# Patient Record
Sex: FEMALE | Race: WHITE | NOT HISPANIC OR LATINO
[De-identification: names, ages, dates, MRNs, and addresses within clinical notes are randomized per-mention and may not be internally consistent; named-entity substitution may affect disease eponyms.]

---

## 2017-04-12 ENCOUNTER — APPOINTMENT (OUTPATIENT)
Dept: PULMONOLOGY | Facility: CLINIC | Age: 62
End: 2017-04-12

## 2017-04-12 VITALS
BODY MASS INDEX: 30.45 KG/M2 | SYSTOLIC BLOOD PRESSURE: 126 MMHG | OXYGEN SATURATION: 97 % | HEIGHT: 67 IN | DIASTOLIC BLOOD PRESSURE: 66 MMHG | HEART RATE: 88 BPM | WEIGHT: 194 LBS

## 2017-04-12 DIAGNOSIS — T81.4XXS INFECTION FOLLOWING A PROCEDURE, SEQUELA: ICD-10-CM

## 2017-04-12 DIAGNOSIS — Z87.19 PERSONAL HISTORY OF OTHER DISEASES OF THE DIGESTIVE SYSTEM: ICD-10-CM

## 2017-04-12 DIAGNOSIS — Z00.00 ENCOUNTER FOR GENERAL ADULT MEDICAL EXAMINATION W/OUT ABNORMAL FINDINGS: ICD-10-CM

## 2017-04-12 DIAGNOSIS — K43.9 VENTRAL HERNIA W/OUT OBSTRUCTION OR GANGRENE: ICD-10-CM

## 2017-04-12 RX ORDER — TIOTROPIUM BROMIDE INHALATION SPRAY 3.12 UG/1
2.5 SPRAY, METERED RESPIRATORY (INHALATION) DAILY
Qty: 1 | Refills: 5 | Status: DISCONTINUED | COMMUNITY
Start: 2017-04-12 | End: 2017-04-12

## 2017-04-12 RX ORDER — TIOTROPIUM BROMIDE 18 UG/1
18 CAPSULE ORAL; RESPIRATORY (INHALATION)
Refills: 0 | Status: DISCONTINUED | COMMUNITY
End: 2017-04-12

## 2017-04-12 RX ORDER — EPHEDRINE HCL, GUAIFENESIN 12.5; 2 MG/1; MG/1
12.5-2 TABLET ORAL
Refills: 0 | Status: DISCONTINUED | COMMUNITY
End: 2017-04-12

## 2017-04-18 ENCOUNTER — FORM ENCOUNTER (OUTPATIENT)
Age: 62
End: 2017-04-18

## 2017-04-19 ENCOUNTER — OUTPATIENT (OUTPATIENT)
Dept: OUTPATIENT SERVICES | Facility: HOSPITAL | Age: 62
LOS: 1 days | End: 2017-04-19
Payer: COMMERCIAL

## 2017-04-19 ENCOUNTER — APPOINTMENT (OUTPATIENT)
Dept: CT IMAGING | Facility: CLINIC | Age: 62
End: 2017-04-19

## 2017-04-19 DIAGNOSIS — Z00.8 ENCOUNTER FOR OTHER GENERAL EXAMINATION: ICD-10-CM

## 2017-04-19 PROCEDURE — G0297: CPT

## 2017-06-22 ENCOUNTER — APPOINTMENT (OUTPATIENT)
Dept: PULMONOLOGY | Facility: CLINIC | Age: 62
End: 2017-06-22

## 2017-07-13 ENCOUNTER — RX RENEWAL (OUTPATIENT)
Age: 62
End: 2017-07-13

## 2017-10-13 ENCOUNTER — RX RENEWAL (OUTPATIENT)
Age: 62
End: 2017-10-13

## 2017-10-16 ENCOUNTER — RX RENEWAL (OUTPATIENT)
Age: 62
End: 2017-10-16

## 2017-12-14 ENCOUNTER — RX RENEWAL (OUTPATIENT)
Age: 62
End: 2017-12-14

## 2018-12-17 ENCOUNTER — APPOINTMENT (OUTPATIENT)
Dept: PULMONOLOGY | Facility: CLINIC | Age: 63
End: 2018-12-17
Payer: COMMERCIAL

## 2018-12-17 VITALS — DIASTOLIC BLOOD PRESSURE: 70 MMHG | BODY MASS INDEX: 29.76 KG/M2 | SYSTOLIC BLOOD PRESSURE: 120 MMHG | WEIGHT: 190 LBS

## 2018-12-17 VITALS — HEART RATE: 81 BPM | OXYGEN SATURATION: 95 %

## 2018-12-17 PROCEDURE — 99214 OFFICE O/P EST MOD 30 MIN: CPT

## 2019-01-14 ENCOUNTER — INPATIENT (INPATIENT)
Facility: HOSPITAL | Age: 64
LOS: 0 days | Discharge: HOME | DRG: 309 | End: 2019-01-15
Attending: HOSPITALIST | Admitting: HOSPITALIST
Payer: COMMERCIAL

## 2019-01-14 VITALS
OXYGEN SATURATION: 98 % | WEIGHT: 184.97 LBS | DIASTOLIC BLOOD PRESSURE: 92 MMHG | HEIGHT: 69 IN | TEMPERATURE: 98 F | RESPIRATION RATE: 20 BRPM | HEART RATE: 142 BPM | SYSTOLIC BLOOD PRESSURE: 152 MMHG

## 2019-01-14 DIAGNOSIS — I48.92 UNSPECIFIED ATRIAL FLUTTER: ICD-10-CM

## 2019-01-14 LAB
ANION GAP SERPL CALC-SCNC: 13 MMOL/L — SIGNIFICANT CHANGE UP (ref 5–17)
BASOPHILS # BLD AUTO: 0 K/UL — SIGNIFICANT CHANGE UP (ref 0–0.2)
BASOPHILS NFR BLD AUTO: 0.3 % — SIGNIFICANT CHANGE UP (ref 0–2)
BUN SERPL-MCNC: 10 MG/DL — SIGNIFICANT CHANGE UP (ref 8–20)
CALCIUM SERPL-MCNC: 9.3 MG/DL — SIGNIFICANT CHANGE UP (ref 8.6–10.2)
CHLORIDE SERPL-SCNC: 106 MMOL/L — SIGNIFICANT CHANGE UP (ref 98–107)
CO2 SERPL-SCNC: 26 MMOL/L — SIGNIFICANT CHANGE UP (ref 22–29)
CREAT SERPL-MCNC: 0.84 MG/DL — SIGNIFICANT CHANGE UP (ref 0.5–1.3)
EOSINOPHIL # BLD AUTO: 0.3 K/UL — SIGNIFICANT CHANGE UP (ref 0–0.5)
EOSINOPHIL NFR BLD AUTO: 2.8 % — SIGNIFICANT CHANGE UP (ref 0–6)
GLUCOSE SERPL-MCNC: 105 MG/DL — SIGNIFICANT CHANGE UP (ref 70–115)
HCT VFR BLD CALC: 47.8 % — HIGH (ref 37–47)
HGB BLD-MCNC: 16.1 G/DL — HIGH (ref 12–16)
LYMPHOCYTES # BLD AUTO: 3.5 K/UL — SIGNIFICANT CHANGE UP (ref 1–4.8)
LYMPHOCYTES # BLD AUTO: 30.6 % — SIGNIFICANT CHANGE UP (ref 20–55)
MCHC RBC-ENTMCNC: 32 PG — HIGH (ref 27–31)
MCHC RBC-ENTMCNC: 33.7 G/DL — SIGNIFICANT CHANGE UP (ref 32–36)
MCV RBC AUTO: 95 FL — SIGNIFICANT CHANGE UP (ref 81–99)
MONOCYTES # BLD AUTO: 0.4 K/UL — SIGNIFICANT CHANGE UP (ref 0–0.8)
MONOCYTES NFR BLD AUTO: 3.5 % — SIGNIFICANT CHANGE UP (ref 3–10)
NEUTROPHILS # BLD AUTO: 7.1 K/UL — SIGNIFICANT CHANGE UP (ref 1.8–8)
NEUTROPHILS NFR BLD AUTO: 62.5 % — SIGNIFICANT CHANGE UP (ref 37–73)
PLATELET # BLD AUTO: 242 K/UL — SIGNIFICANT CHANGE UP (ref 150–400)
POTASSIUM SERPL-MCNC: 4.4 MMOL/L — SIGNIFICANT CHANGE UP (ref 3.5–5.3)
POTASSIUM SERPL-SCNC: 4.4 MMOL/L — SIGNIFICANT CHANGE UP (ref 3.5–5.3)
RBC # BLD: 5.03 M/UL — SIGNIFICANT CHANGE UP (ref 4.4–5.2)
RBC # FLD: 13 % — SIGNIFICANT CHANGE UP (ref 11–15.6)
SODIUM SERPL-SCNC: 145 MMOL/L — SIGNIFICANT CHANGE UP (ref 135–145)
TROPONIN T SERPL-MCNC: <0.01 NG/ML — SIGNIFICANT CHANGE UP (ref 0–0.06)
WBC # BLD: 11.4 K/UL — HIGH (ref 4.8–10.8)
WBC # FLD AUTO: 11.4 K/UL — HIGH (ref 4.8–10.8)

## 2019-01-14 PROCEDURE — 99222 1ST HOSP IP/OBS MODERATE 55: CPT | Mod: 25

## 2019-01-14 PROCEDURE — 70450 CT HEAD/BRAIN W/O DYE: CPT | Mod: 26

## 2019-01-14 PROCEDURE — 93010 ELECTROCARDIOGRAM REPORT: CPT | Mod: 76

## 2019-01-14 PROCEDURE — 99291 CRITICAL CARE FIRST HOUR: CPT

## 2019-01-14 PROCEDURE — 99406 BEHAV CHNG SMOKING 3-10 MIN: CPT

## 2019-01-14 PROCEDURE — 71045 X-RAY EXAM CHEST 1 VIEW: CPT | Mod: 26

## 2019-01-14 RX ORDER — ALPRAZOLAM 0.25 MG
1 TABLET ORAL
Qty: 0 | Refills: 0 | COMMUNITY

## 2019-01-14 RX ORDER — FLECAINIDE ACETATE 50 MG
100 TABLET ORAL EVERY 12 HOURS
Qty: 0 | Refills: 0 | Status: DISCONTINUED | OUTPATIENT
Start: 2019-01-14 | End: 2019-01-15

## 2019-01-14 RX ORDER — CHOLECALCIFEROL (VITAMIN D3) 125 MCG
1 CAPSULE ORAL
Qty: 0 | Refills: 0 | COMMUNITY

## 2019-01-14 RX ORDER — PREGABALIN 225 MG/1
1 CAPSULE ORAL
Qty: 0 | Refills: 0 | COMMUNITY

## 2019-01-14 RX ORDER — DILTIAZEM HCL 120 MG
5 CAPSULE, EXT RELEASE 24 HR ORAL
Qty: 125 | Refills: 0 | Status: DISCONTINUED | OUTPATIENT
Start: 2019-01-14 | End: 2019-01-14

## 2019-01-14 RX ORDER — TIOTROPIUM BROMIDE AND OLODATEROL 3.124; 2.736 UG/1; UG/1
2 SPRAY, METERED RESPIRATORY (INHALATION)
Qty: 0 | Refills: 0 | COMMUNITY

## 2019-01-14 RX ORDER — ALBUTEROL 90 UG/1
2 AEROSOL, METERED ORAL
Qty: 0 | Refills: 0 | COMMUNITY

## 2019-01-14 RX ORDER — ALPRAZOLAM 0.25 MG
0.5 TABLET ORAL DAILY
Qty: 0 | Refills: 0 | Status: DISCONTINUED | OUTPATIENT
Start: 2019-01-14 | End: 2019-01-15

## 2019-01-14 RX ORDER — ROFLUMILAST 500 UG/1
1 TABLET ORAL
Qty: 0 | Refills: 0 | COMMUNITY

## 2019-01-14 RX ORDER — VORTIOXETINE 5 MG/1
1 TABLET, FILM COATED ORAL
Qty: 0 | Refills: 0 | COMMUNITY

## 2019-01-14 RX ORDER — ADENOSINE 3 MG/ML
6 INJECTION INTRAVENOUS ONCE
Qty: 0 | Refills: 0 | Status: COMPLETED | OUTPATIENT
Start: 2019-01-14 | End: 2019-01-14

## 2019-01-14 RX ORDER — APIXABAN 2.5 MG/1
5 TABLET, FILM COATED ORAL EVERY 12 HOURS
Qty: 0 | Refills: 0 | Status: DISCONTINUED | OUTPATIENT
Start: 2019-01-14 | End: 2019-01-15

## 2019-01-14 RX ORDER — ALBUTEROL 90 UG/1
1 AEROSOL, METERED ORAL
Qty: 0 | Refills: 0 | Status: DISCONTINUED | OUTPATIENT
Start: 2019-01-14 | End: 2019-01-15

## 2019-01-14 RX ORDER — FLUTICASONE PROPIONATE AND SALMETEROL 50; 250 UG/1; UG/1
1 POWDER ORAL; RESPIRATORY (INHALATION)
Qty: 0 | Refills: 0 | COMMUNITY

## 2019-01-14 RX ADMIN — Medication 5 MG/HR: at 19:15

## 2019-01-14 RX ADMIN — Medication 100 MILLIGRAM(S): at 19:28

## 2019-01-14 RX ADMIN — APIXABAN 5 MILLIGRAM(S): 2.5 TABLET, FILM COATED ORAL at 19:27

## 2019-01-14 RX ADMIN — Medication 5 MG/HR: at 16:59

## 2019-01-14 RX ADMIN — ADENOSINE 6 MILLIGRAM(S): 3 INJECTION INTRAVENOUS at 16:53

## 2019-01-14 NOTE — H&P ADULT - HISTORY OF PRESENT ILLNESS
Pt seen and examined at 8:45 PM on 1/14/18  Patient presented to the ED today w/ palpitations and a headache. PMH COPD, Anxiety, bronchitis, Tobacco dependence.   Patient states while she was at home today she began to have palpitations and a severe headache, after atul palpitations which lasted approx 15 minutes she had a chest pressure and had body aches, she continued to feel unwell at home so took 2 baby aspirin and then came to the ED. She denies any past hx of MI/stroke/TIA, she had a cardiac cath in March 2018 which showed no obstructive disease as per cardiology note, she states while in the ED she got some medicine that made her feel better. She has no other complaints at this time. She feels back at her baseline.   In ED she was found to be in A. flutter w/ 2:1 AV conduction, she received Adenosine and Cardizem and convereted to NSR. She also was started on flecanide and Eliquis. She was seen by cardiology.   Currently denies headaches, nausea, vomiting, chest pain, SOB, palpitations, abdominal pain, constipation, diarrhea, melena, hematochezia, dysuria. She had a CT head which was negative for acute Pathology.

## 2019-01-14 NOTE — ED ADULT NURSE NOTE - OBJECTIVE STATEMENT
63 yofpresents to ed with tachycardia/ palpitations irregular ekg. pt reports having chest pressure for 2 hours. adenosine given ivp, a flutter on monitor. hourly rounding down to 20, vs otherwise wdl  xanax copd inhaler  10 cardizem [id number]

## 2019-01-14 NOTE — H&P ADULT - NSHPSOCIALHISTORY_GEN_ALL_CORE
Denies fam hx of Atrial fib or flutter in mother or father.   1/2 ppd smoker, rare etoh use, no drug use.

## 2019-01-14 NOTE — H&P ADULT - PROBLEM SELECTOR PLAN 3
Patient was counselled on tobacco cessation. She was informed of the increased risk of lung cancer and cardiovascular disease, COPD among other health risks associated with smoking and tobacco use. Cessation was advised. Patient has tried Nicoderm in past and does not wish to use today. She wants to quit smoking. 3 min spent on counselling.

## 2019-01-14 NOTE — ED PROVIDER NOTE - OBJECTIVE STATEMENT
C/C PALPITATIONS  62 YO FEMALE  WITH ABOVE C/C.  PT GIVES HX OF HAVING SOME BLURRY VISION AT NOON. SHE STATES IT RESOLVED ON ITS OWN. AROUND 2:30 PM SHE DEVELOPED A SEVERE HEADACHE ALL OVER AND PALPITATIONS. SHE THEN DEVELOPED CP GOING TO HER BACK. HER DAUGHTER GAVE HER ASPIRIN AND A SHORT WHILE AFTER THAT SHE FELT BETTER. UPON ARRIVAL TO THE ER SHE WAS FOUND TO HAVE AN ELEVATED HEART RATE OVER 130.   NO SYMPTOMS ASSOCIATED WITH ELEVATED HEART RATE   NO OTHER SYMPTOMS RELATED TO VISIT.  MED HX COPD, + CIGARETTES.

## 2019-01-14 NOTE — H&P ADULT - NSHPPHYSICALEXAM_GEN_ALL_CORE
T(C): 36.7 (01-14-19 @ 16:12), Max: 36.7 (01-14-19 @ 16:12)  HR: 132 (01-14-19 @ 17:26) (132 - 150)  BP: 146/65 (01-14-19 @ 17:26) (113/61 - 152/92)  RR: 20 (01-14-19 @ 17:26) (20 - 20)  SpO2: 96% (01-14-19 @ 17:26) (96% - 99%)  Wt(kg): --    Physical Exam:   GENERAL: well-groomed, well-developed, NAD  HEENT: head NC/AT; EOM intact, PERRLA, conjunctiva & sclera clear; hearing grossly intact, moist mucous membranes  NECK: supple, no JVD  RESPIRATORY: CTA B/L, no wheezing, rales, rhonchi or rubs  CARDIOVASCULAR: S1&S2, RRR, no murmurs or gallops  ABDOMEN: soft, non-tender, non-distended, + Bowel sounds x4 quadrants, no guarding, rebound or rigidity  MUSCULOSKELETAL:  no clubbing, cyanosis or edema of all 4 extremities  LYMPH: no cervical lymphadenopathy  VASCULAR: Radial pulses 2+ bilaterally, no varicose veins   SKIN: warm and dry, color normal  NEUROLOGIC: AA&O X3, CN2-12 intact w/ no focal deficits, no sensory loss, motor Strength 5/5 in UE & LE B/L  Psych: Normal mood and affect, normal behavior

## 2019-01-14 NOTE — ED PROVIDER NOTE - CRITICAL CARE PROVIDED
additional history taking/consultation with other physicians/direct patient care (not related to procedure)/consult w/ pt's family directly relating to pts condition/interpretation of diagnostic studies/documentation/50 MINUTES

## 2019-01-14 NOTE — ED ADULT TRIAGE NOTE - CHIEF COMPLAINT QUOTE
PAtient BIBA, complains of palpitations for past 2 hours, heaviness to chest, denies pain, denies any SOB

## 2019-01-14 NOTE — H&P ADULT - ASSESSMENT
Patient presented to the ED today w/ palpitations and a headache found to be in A. flutter. PMH COPD, Anxiety, bronchitis, Tobacco dependence.

## 2019-01-14 NOTE — PHARMACOTHERAPY INTERVENTION NOTE - COMMENTS
Completed patient's medication reconciliation. All medication information obtained from patient's home pharmacy and verified with patient.
Heart palpitations

## 2019-01-14 NOTE — CONSULT NOTE ADULT - SUBJECTIVE AND OBJECTIVE BOX
[facility], [facility]                                          [address], [address], [place] [address]                                               PHONE: [phone]    FAX: [phone] [address], [address], [place] [address]                                       PHONE: [phone]    FAX: [phone]  *******************************************************************************    Reason for Consult: Atrial flutter    HPI:  AKSHAT MESSINA is a 63y woman with significant history of COPD and current everyday smoking, who presents to the ER for evaluation of palpitations, found to have atrial flutter with 2:1 AV conduction.  The patient states that she had been feeling well after recovering from recent bronchitis/COPD exacerbation when she developed headache and palpitations and decided to come to ER.  At the time of this evaluation, the patient states that she is feeling much better and denies any chest pain, palpitations, shortness of breath or difficulty breathing.  No syncope or near syncope.     PAST MEDICAL & SURGICAL HISTORY:  Chronic bronchitis with acute exacerbation  COPD (chronic obstructive pulmonary disease)      Biaxin (Unknown)  Cipro (Unknown)  Levaquin (Unknown)  sulfa drugs (Unknown)      MEDICATIONS  (STANDING):  diltiazem    Tablet 60 milliGRAM(s) Oral every 6 hours  diltiazem Infusion 5 mG/Hr (5 mL/Hr) IV Continuous <Continuous>  flecainide 100 milliGRAM(s) Oral every 12 hours    MEDICATIONS  (PRN):      Social History: no active tobacco / EtOH / IVDA    Family History:     ROS: As noted above, otherwise unremarkable.    Vital Signs Last 24 Hrs  T(C): 36.7 (14 Jan 2019 16:12), Max: 36.7 (14 Jan 2019 16:12)  T(F): 98 (14 Jan 2019 16:12), Max: 98 (14 Jan 2019 16:12)  HR: 132 (14 Jan 2019 17:26) (132 - 150)  BP: 146/65 (14 Jan 2019 17:26) (113/61 - 152/92)  BP(mean): --  RR: 20 (14 Jan 2019 17:26) (20 - 20)  SpO2: 96% (14 Jan 2019 17:26) (96% - 99%)    I&O's Detail    14 Jan 2019 07:01  -  14 Jan 2019 17:40  --------------------------------------------------------  IN:    diltiazem Infusion: 30 mL  Total IN: 30 mL    OUT:  Total OUT: 0 mL    Total NET: 30 mL        I&O's Summary    14 Jan 2019 07:01  -  14 Jan 2019 17:40  --------------------------------------------------------  IN: 30 mL / OUT: 0 mL / NET: 30 mL            PHYSICAL EXAM:  General: Appears well developed, well nourished, no acute distress  HEENT: Head: normocephalic, atraumatic  Eyes: Pupils equal and reactive  Neck: Supple, no carotid bruit, no JVD, no HJR  CARDIOVASCULAR: Regular tachycardia S1 and S2, no murmur, rub, or gallop  LUNGS: Clear to auscultation bilaterally, no rales, rhonchi or wheeze  ABDOMEN: Soft, nontender, non-distended, positive bowel sounds, no mass or bruit  EXTREMITIES: No edema, distal pulses WNL  SKIN: Warm and dry with normal turgor  NEURO: Alert & oriented x 3, grossly intact  PSYCH: normal mood and affect    LABS:                        16.1   11.4  )-----------( 242      ( 14 Jan 2019 16:41 )             47.8     01-14    145  |  106  |  10.0  ----------------------------<  105  4.4   |  26.0  |  0.84    Ca    9.3      14 Jan 2019 16:41      CARDIAC MARKERS ( 14 Jan 2019 16:41 )  x     / <0.01 ng/mL / x     / x     / x            RADIOLOGY & ADDITIONAL STUDIES:    ECG: atrial flutter with 2:1 AV conduction    ECHO (03/2018): mild concentric LVH with normal left ventricular systolic function; no hemodynamically significant valvular lesions noted.     Carotid Duplex (03/2018): mild-moderate (16-49%) b/l ICA stenosis    CARDIAC CATHETERIZATION (03/2018): no evidence of obstructive coronary artery disease.     Assessment and Plan:  In summary, AKSHAT MESSINA is a 63y woman with significant history of COPD and current everyday smoking, seen in the ER for evaluation of palpitations, found to have atrial flutter with 2:1 AV conduction.    - Monitor on telemetry  - The patient has been chest pain free since admission, without ischemic ECG abnormalities and negative initial troponin; doubt acute MI.  Moreover, the patient is s/p cardiac cath 03/2018 showing no evidence of obstructive coronary disease.   - No evidence of decompensated HF clinically.   - Echocardiogram from 03/2018 reviewed as above.    - Rhythm/hemodynamics: BP stable; patient remains in atrial flutter with 2:1 AV conduction.  Brief episodes of sinus rhythm noted.  Discussed with EP, Dr. Bustamante.  Given underlying COPD will avoid beta blockers and amiodarone.  Will start flecainide 100 mg PO bid along with cardizem 60 mg PO q6hr and titrate as needed to control HR and wean off cardizem drip.   - If converts back to and maintains sinus rhythm, would anticipate discharge to home tomorrow with early outpatient follow up with Dr. Woody.   - Check TSH.   - Keep K > 4, Mg > 2    Thank you for allowing me to participate in the care of your patient.      Sincerely,    Nick Lopez M.D. [facility], [facility]                                          [address], [place] [address]                                               PHONE: [phone]    FAX: [phone] [address], [address], [place] [address]                                       PHONE: [phone]    FAX: [phone]  *******************************************************************************    Reason for Consult: Atrial flutter    HPI:  AKSHAT MESSINA is a 63y woman with significant history of COPD and current everyday smoking, who presents to the ER for evaluation of palpitations, found to have atrial flutter with 2:1 AV conduction.  The patient states that she had been feeling well after recovering from recent bronchitis/COPD exacerbation when she developed headache and palpitations and decided to come to ER.  At the time of this evaluation, the patient states that she is feeling much better and denies any chest pain, palpitations, shortness of breath or difficulty breathing.  No syncope or near syncope.     PAST MEDICAL & SURGICAL HISTORY:  Chronic bronchitis with acute exacerbation  COPD (chronic obstructive pulmonary disease)      Biaxin (Unknown)  Cipro (Unknown)  Levaquin (Unknown)  sulfa drugs (Unknown)      MEDICATIONS  (STANDING):  diltiazem    Tablet 60 milliGRAM(s) Oral every 6 hours  diltiazem Infusion 5 mG/Hr (5 mL/Hr) IV Continuous <Continuous>  flecainide 100 milliGRAM(s) Oral every 12 hours    MEDICATIONS  (PRN):      Social History: no active tobacco / EtOH / IVDA    Family History:     ROS: As noted above, otherwise unremarkable.    Vital Signs Last 24 Hrs  T(C): 36.7 (14 Jan 2019 16:12), Max: 36.7 (14 Jan 2019 16:12)  T(F): 98 (14 Jan 2019 16:12), Max: 98 (14 Jan 2019 16:12)  HR: 132 (14 Jan 2019 17:26) (132 - 150)  BP: 146/65 (14 Jan 2019 17:26) (113/61 - 152/92)  BP(mean): --  RR: 20 (14 Jan 2019 17:26) (20 - 20)  SpO2: 96% (14 Jan 2019 17:26) (96% - 99%)    I&O's Detail    14 Jan 2019 07:01  -  14 Jan 2019 17:40  --------------------------------------------------------  IN:    diltiazem Infusion: 30 mL  Total IN: 30 mL    OUT:  Total OUT: 0 mL    Total NET: 30 mL        I&O's Summary    14 Jan 2019 07:01  -  14 Jan 2019 17:40  --------------------------------------------------------  IN: 30 mL / OUT: 0 mL / NET: 30 mL            PHYSICAL EXAM:  General: Appears well developed, well nourished, no acute distress  HEENT: Head: normocephalic, atraumatic  Eyes: Pupils equal and reactive  Neck: Supple, no carotid bruit, no JVD, no HJR  CARDIOVASCULAR: Regular tachycardia S1 and S2, no murmur, rub, or gallop  LUNGS: Clear to auscultation bilaterally, no rales, rhonchi or wheeze  ABDOMEN: Soft, nontender, non-distended, positive bowel sounds, no mass or bruit  EXTREMITIES: No edema, distal pulses WNL  SKIN: Warm and dry with normal turgor  NEURO: Alert & oriented x 3, grossly intact  PSYCH: normal mood and affect    LABS:                        16.1   11.4  )-----------( 242      ( 14 Jan 2019 16:41 )             47.8     01-14    145  |  106  |  10.0  ----------------------------<  105  4.4   |  26.0  |  0.84    Ca    9.3      14 Jan 2019 16:41      CARDIAC MARKERS ( 14 Jan 2019 16:41 )  x     / <0.01 ng/mL / x     / x     / x            RADIOLOGY & ADDITIONAL STUDIES:    ECG: atrial flutter with 2:1 AV conduction    ECHO (03/2018): mild concentric LVH with normal left ventricular systolic function; no hemodynamically significant valvular lesions noted.     Carotid Duplex (03/2018): mild-moderate (16-49%) b/l ICA stenosis    CARDIAC CATHETERIZATION (03/2018): no evidence of obstructive coronary artery disease.     Assessment and Plan:  In summary, AKSHAT MESSINA is a 63y woman with significant history of COPD and current everyday smoking, seen in the ER for evaluation of palpitations, found to have atrial flutter with 2:1 AV conduction.    - Monitor on telemetry  - The patient has been chest pain free since admission, without ischemic ECG abnormalities and negative initial troponin; doubt acute MI.  Moreover, the patient is s/p cardiac cath 03/2018 showing no evidence of obstructive coronary disease.   - No evidence of decompensated HF clinically.   - Echocardiogram from 03/2018 reviewed as above.    - Rhythm/hemodynamics: BP stable; patient remains in atrial flutter with 2:1 AV conduction.  Brief episodes of sinus rhythm noted.  Discussed with EP, Dr. Bustamante.  Given underlying COPD will avoid beta blockers and amiodarone.  Will start flecainide 100 mg PO bid along with cardizem 60 mg PO q6hr and titrate as needed to control HR and wean off cardizem drip.   - If converts back to and maintains sinus rhythm, would anticipate discharge to home tomorrow with early outpatient follow up with Dr. Woody.   - Paroxsymal atrial flutter: CHADSVASC score appears to be 1 however as we are giving antiarrhythmics with the goal of converting to and maintaining sinus rhythm, after discussing risks, benefits, and alternatives, will start eliquis 5 mg bid.   - Check TSH.   - Keep K > 4, Mg > 2    Thank you for allowing me to participate in the care of your patient.      Sincerely,    Nick Lopez M.D.

## 2019-01-14 NOTE — ED ADULT NURSE NOTE - NSIMPLEMENTINTERV_GEN_ALL_ED
Implemented All Universal Safety Interventions:  [place] to call system. Call bell, personal items and telephone within reach. Instruct patient to call for assistance. Room bathroom lighting operational. Non-slip footwear when patient is off stretcher. Physically safe environment: no spills, clutter or unnecessary equipment. Stretcher in lowest position, wheels locked, appropriate side rails in place.

## 2019-01-14 NOTE — ED ADULT NURSE REASSESSMENT NOTE - NS ED NURSE REASSESS COMMENT FT1
Report received from zelalem shannon.  Pt resting comfortably on stretcher.  No complaints of pain.  Respirations even and unlabored.  Family at bedside for supportive care.  Pt. noted to be sinus on the cardiac monitor with HR 74.  Pt. denies chest pain/sob or feeling of palpitations. Awaiting bed placement.  PIV wnl; flushing without difficulty.  In NAD, will continue to monitor.

## 2019-01-14 NOTE — H&P ADULT - PROBLEM SELECTOR PLAN 1
A flutter with 2:1 AV conduction. Converted to NSR.   -monitor on telemetry.   -cardio rec's appreciated.   -started on flecanide and Eliquis and PO cardizem.   -f/u TSH level.   -ordered TTE

## 2019-01-14 NOTE — ED PROVIDER NOTE - PROGRESS NOTE DETAILS
PT WITH TACHYARRHYTHMIA  GIVEN ADENOCARD TO SLOW RATE. A FLUTTER REVEALED. IV CARDIZEM STARTED AS BOLUS THEN DRIP. RATE ADJUSTED. HR DECREASED TO 79. BP STABLE. SEEN BY [facility]. WILL ADMIT

## 2019-01-15 ENCOUNTER — TRANSCRIPTION ENCOUNTER (OUTPATIENT)
Age: 64
End: 2019-01-15

## 2019-01-15 VITALS
HEART RATE: 69 BPM | OXYGEN SATURATION: 94 % | RESPIRATION RATE: 20 BRPM | TEMPERATURE: 98 F | SYSTOLIC BLOOD PRESSURE: 124 MMHG | DIASTOLIC BLOOD PRESSURE: 73 MMHG

## 2019-01-15 DIAGNOSIS — F17.200 NICOTINE DEPENDENCE, UNSPECIFIED, UNCOMPLICATED: ICD-10-CM

## 2019-01-15 DIAGNOSIS — Z29.9 ENCOUNTER FOR PROPHYLACTIC MEASURES, UNSPECIFIED: ICD-10-CM

## 2019-01-15 DIAGNOSIS — Z98.890 OTHER SPECIFIED POSTPROCEDURAL STATES: Chronic | ICD-10-CM

## 2019-01-15 DIAGNOSIS — I48.92 UNSPECIFIED ATRIAL FLUTTER: ICD-10-CM

## 2019-01-15 DIAGNOSIS — J44.9 CHRONIC OBSTRUCTIVE PULMONARY DISEASE, UNSPECIFIED: ICD-10-CM

## 2019-01-15 PROCEDURE — 96365 THER/PROPH/DIAG IV INF INIT: CPT

## 2019-01-15 PROCEDURE — 36415 COLL VENOUS BLD VENIPUNCTURE: CPT

## 2019-01-15 PROCEDURE — 71045 X-RAY EXAM CHEST 1 VIEW: CPT

## 2019-01-15 PROCEDURE — 85027 COMPLETE CBC AUTOMATED: CPT

## 2019-01-15 PROCEDURE — 84484 ASSAY OF TROPONIN QUANT: CPT

## 2019-01-15 PROCEDURE — 99239 HOSP IP/OBS DSCHRG MGMT >30: CPT

## 2019-01-15 PROCEDURE — 93005 ELECTROCARDIOGRAM TRACING: CPT

## 2019-01-15 PROCEDURE — 99291 CRITICAL CARE FIRST HOUR: CPT | Mod: 25

## 2019-01-15 PROCEDURE — 70450 CT HEAD/BRAIN W/O DYE: CPT

## 2019-01-15 PROCEDURE — 96366 THER/PROPH/DIAG IV INF ADDON: CPT

## 2019-01-15 PROCEDURE — 80048 BASIC METABOLIC PNL TOTAL CA: CPT

## 2019-01-15 PROCEDURE — 96375 TX/PRO/DX INJ NEW DRUG ADDON: CPT

## 2019-01-15 RX ORDER — DILTIAZEM HCL 120 MG
1 CAPSULE, EXT RELEASE 24 HR ORAL
Qty: 30 | Refills: 0 | OUTPATIENT
Start: 2019-01-15 | End: 2019-02-13

## 2019-01-15 RX ORDER — FLECAINIDE ACETATE 50 MG
1 TABLET ORAL
Qty: 60 | Refills: 0 | OUTPATIENT
Start: 2019-01-15 | End: 2019-02-13

## 2019-01-15 RX ORDER — APIXABAN 2.5 MG/1
1 TABLET, FILM COATED ORAL
Qty: 60 | Refills: 0 | OUTPATIENT
Start: 2019-01-15 | End: 2019-02-13

## 2019-01-15 RX ADMIN — Medication 100 MILLIGRAM(S): at 06:31

## 2019-01-15 RX ADMIN — APIXABAN 5 MILLIGRAM(S): 2.5 TABLET, FILM COATED ORAL at 06:31

## 2019-01-15 NOTE — DISCHARGE NOTE ADULT - MEDICATION SUMMARY - MEDICATIONS TO TAKE
I will START or STAY ON the medications listed below when I get home from the hospital:    flecainide 100 mg oral tablet  -- 1 tab(s) by mouth every 12 hours  -- Indication: For AFlutter    Cardizem  mg/24 hours oral capsule, extended release  -- 1 cap(s) by mouth once a day   -- It is very important that you take or use this exactly as directed.  Do not skip doses or discontinue unless directed by your doctor.  Some non-prescription drugs may aggravate your condition.  Read all labels carefully.  If a warning appears, check with your doctor before taking.  Swallow whole.  Do not crush.    -- Indication: For AFlutter    apixaban 5 mg oral tablet  -- 1 tab(s) by mouth every 12 hours  -- Indication: For AFlutter    Xanax 0.5 mg oral tablet  -- 1 tab(s) by mouth once a day  -- Indication: For Anxieety    ProAir HFA 90 mcg/inh inhalation aerosol  -- 2 puff(s) inhaled 4 times a day, As Needed  -- Indication: For sob    Advair Diskus 250 mcg-50 mcg inhalation powder  -- 1 puff(s) inhaled 2 times a day  -- Indication: For sob    Daliresp 500 mcg oral tablet  -- 1 tab(s) by mouth once a day  -- Indication: For sob    Vitamin B-12 100 mcg oral tablet  -- 1 tab(s) by mouth once a day  -- Indication: For nutrition    Vitamin D3 400 intl units oral tablet  -- 1 tab(s) by mouth once a day  -- Indication: For nutrition

## 2019-01-15 NOTE — DISCHARGE NOTE ADULT - CARE PLAN
Principal Discharge DX:	Atrial flutter, unspecified type  Goal:	resolved  Assessment and plan of treatment:	Continue with meds as directed. Follow up with your primary doctor & cardiologist within 1 week of discharge. Please get TSH level  & Echo done as outpatient  Secondary Diagnosis:	COPD (chronic obstructive pulmonary disease)  Assessment and plan of treatment:	Continue with home meds as directed. Follow up with your primary doctor & pulmonologist

## 2019-01-15 NOTE — DISCHARGE NOTE ADULT - PLAN OF CARE
resolved Continue with meds as directed. Follow up with your primary doctor & cardiologist within 1 week of discharge. Please get TSH level  & Echo done as outpatient Continue with home meds as directed. Follow up with your primary doctor & pulmonologist

## 2019-01-15 NOTE — DISCHARGE NOTE ADULT - CARE PROVIDER_API CALL
Yariel Jonas (MD), Cardiovascular Disease; Internal Medicine  [address] [place] [address]  Phone: [phone]  Fax: [phone]

## 2019-01-15 NOTE — DISCHARGE NOTE ADULT - HOSPITAL COURSE
Patient presented to the ED today w/ palpitations and a headache found to be in A. flutter. PMH COPD, Anxiety, bronchitis, Tobacco dependence.      Problem/Plan - 1:  ·  Problem: Atrial flutter, unspecified type.  Plan: A flutter with 2:1 AV conduction.  -cardio rec's appreciated.   -started on flecanide, converted to NSR. started on Eliquis and PO cardizem.   - TSH level & TTE to be done as outpt. Cardio cleared pt for d/c     Problem/Plan - 2:  ·  Problem: COPD (chronic obstructive pulmonary disease).  Plan: No acute exacerbation.   Albuterol PRN.      Problem/Plan - 3:  ·  Problem: Tobacco dependence.  Plan: Patient was counselled on tobacco cessation. She was informed of the increased risk of lung cancer and cardiovascular disease, COPD among other health risks associated with smoking and tobacco use. Cessation was advised. Patient has tried Nicoderm in past and does not wish to use today. She wants to quit smoking. 3 min spent on counselling.      Problem/Plan - 4:  ·  Problem: Prophylactic measure.  Plan: On Eliquis.         PHYSICAL EXAM-  GENERAL: NAD, well-groomed, well-developed  HEAD:  Atraumatic, Normocephalic  EYES: EOMI, PERRLA, conjunctiva and sclera clear  NECK: Supple, No JVD, Normal thyroid  NERVOUS SYSTEM:  Alert & Oriented X3, Motor Strength 5/5 B/L upper and lower extremities; DTRs 2+ intact and symmetric  CHEST/LUNG: Clear to percussion bilaterally; No rales, rhonchi, wheezing, or rubs  HEART: Regular rate and rhythm; No murmurs, rubs, or gallops  ABDOMEN: Soft, Nontender, Nondistended; Bowel sounds present  EXTREMITIES:  2+ Peripheral Pulses, No clubbing, cyanosis, or edema    VSS. Medically stable for d/c

## 2019-01-15 NOTE — DISCHARGE NOTE ADULT - PATIENT PORTAL LINK FT
You can access the [email or web address] Patient Portal, offered by [facility], by registering with the following website: [email or web address]

## 2019-01-15 NOTE — PROGRESS NOTE ADULT - SUBJECTIVE AND OBJECTIVE BOX
INTERVAL HISTORY: Feels well, denies CP,SOB,Palpitation  	  MEDICATIONS:  diltiazem    Tablet 60 milliGRAM(s) Oral every 6 hours  flecainide 100 milliGRAM(s) Oral every 12 hours  ALBUTerol    90 MICROgram(s) HFA Inhaler 1 Puff(s) Inhalation four times a day PRN  ALPRAZolam 0.5 milliGRAM(s) Oral daily  apixaban 5 milliGRAM(s) Oral every 12 hours    PHYSICAL EXAM:  T(C): 36.7 (01-15-19 @ 04:20), Max: 36.7 (01-14-19 @ 16:12)  HR: 76 (01-15-19 @ 04:20) (75 - 150)  BP: 131/67 (01-15-19 @ 06:32) (113/61 - 152/92)  RR: 20 (01-15-19 @ 04:20) (20 - 20)  SpO2: 93% (01-15-19 @ 04:20) (93% - 99%)  Wt(kg): --  I&O's Summary    14 Jan 2019 07:01  -  15 Mario 2019 07:00  --------------------------------------------------------  IN: 30 mL / OUT: 0 mL / NET: 30 mL      Height (cm): 175.26 (01-14 @ 16:12)  Weight (kg): 83.9 (01-14 @ 16:12)  BMI (kg/m2): 27.3 (01-14 @ 16:12)  BSA (m2): 2 (01-14 @ 16:12)    Appearance: Normal	  HEENT:   Normal oral mucosa  Cardiovascular: Normal S1 S2, No JVD, No murmurs, No edema  Respiratory: Lungs clear to auscultation	  Psychiatry: A & O x 3, Mood & affect appropriate  Gastrointestinal:  Soft, Non-tender, + BS	  Skin: No rashes, No ecchymoses, No cyanosis  Neurologic: Non-focal  Extremities: Normal range of motion, No clubbing, cyanosis or edema  Vascular: Peripheral pulses palpable 2+ bilaterally    TELEMETRY: 	SR    	  LABS:	 	                    16.1   11.4  )-----------( 242      ( 14 Jan 2019 16:41 )             47.8     01-14    145  |  106  |  10.0  ----------------------------<  105  4.4   |  26.0  |  0.84    Ca    9.3      14 Jan 2019 16:41

## 2019-01-16 ENCOUNTER — FORM ENCOUNTER (OUTPATIENT)
Age: 64
End: 2019-01-16

## 2019-01-17 ENCOUNTER — OUTPATIENT (OUTPATIENT)
Dept: OUTPATIENT SERVICES | Facility: HOSPITAL | Age: 64
LOS: 1 days | End: 2019-01-17
Payer: COMMERCIAL

## 2019-01-17 ENCOUNTER — APPOINTMENT (OUTPATIENT)
Dept: CT IMAGING | Facility: CLINIC | Age: 64
End: 2019-01-17
Payer: COMMERCIAL

## 2019-01-17 DIAGNOSIS — Z12.2 ENCOUNTER FOR SCREENING FOR MALIGNANT NEOPLASM OF RESPIRATORY ORGANS: ICD-10-CM

## 2019-01-17 DIAGNOSIS — Z98.890 OTHER SPECIFIED POSTPROCEDURAL STATES: Chronic | ICD-10-CM

## 2019-01-17 PROBLEM — J44.9 CHRONIC OBSTRUCTIVE PULMONARY DISEASE, UNSPECIFIED: Chronic | Status: ACTIVE | Noted: 2019-01-14

## 2019-01-17 PROBLEM — J20.9 ACUTE BRONCHITIS, UNSPECIFIED: Chronic | Status: ACTIVE | Noted: 2019-01-14

## 2019-01-17 PROCEDURE — G0297: CPT

## 2019-01-17 PROCEDURE — G0297: CPT | Mod: 26

## 2019-02-06 ENCOUNTER — RX RENEWAL (OUTPATIENT)
Age: 64
End: 2019-02-06

## 2019-02-11 RX ORDER — TIOTROPIUM BROMIDE AND OLODATEROL 3.124; 2.736 UG/1; UG/1
2.5-2.5 SPRAY, METERED RESPIRATORY (INHALATION) DAILY
Qty: 1 | Refills: 5 | Status: DISCONTINUED | COMMUNITY
Start: 2017-04-12 | End: 2019-02-11

## 2019-03-21 ENCOUNTER — APPOINTMENT (OUTPATIENT)
Dept: PULMONOLOGY | Facility: CLINIC | Age: 64
End: 2019-03-21
Payer: COMMERCIAL

## 2019-03-21 VITALS — OXYGEN SATURATION: 93 % | SYSTOLIC BLOOD PRESSURE: 108 MMHG | HEART RATE: 64 BPM | DIASTOLIC BLOOD PRESSURE: 60 MMHG

## 2019-03-21 DIAGNOSIS — Z86.79 PERSONAL HISTORY OF OTHER DISEASES OF THE CIRCULATORY SYSTEM: ICD-10-CM

## 2019-03-21 DIAGNOSIS — K21.9 GASTRO-ESOPHAGEAL REFLUX DISEASE W/OUT ESOPHAGITIS: ICD-10-CM

## 2019-03-21 DIAGNOSIS — Z12.2 ENCOUNTER FOR SCREENING FOR MALIGNANT NEOPLASM OF RESPIRATORY ORGANS: ICD-10-CM

## 2019-03-21 PROCEDURE — 94060 EVALUATION OF WHEEZING: CPT

## 2019-03-21 PROCEDURE — G0296 VISIT TO DETERM LDCT ELIG: CPT

## 2019-03-21 PROCEDURE — 99215 OFFICE O/P EST HI 40 MIN: CPT | Mod: 25

## 2019-03-21 PROCEDURE — 94664 DEMO&/EVAL PT USE INHALER: CPT | Mod: 59

## 2019-03-21 NOTE — REASON FOR VISIT
[Follow-Up] : a follow-up visit [Shortness of Breath] : shortness of Breath [COPD] : COPD [Cough] : cough

## 2019-03-21 NOTE — PHYSICAL EXAM
[General Appearance - Well Developed] : well developed [General Appearance - Well Nourished] : well nourished [Normal Conjunctiva] : the conjunctiva exhibited no abnormalities [Normal Oropharynx] : normal oropharynx [Neck Appearance] : the appearance of the neck was normal [Neck Cervical Mass (___cm)] : no neck mass was observed [Thyroid Diffuse Enlargement] : the thyroid was not enlarged [Jugular Venous Distention Increased] : there was no jugular-venous distention [Murmurs] : no murmurs present [Heart Rate And Rhythm] : heart rate and rhythm were normal [Heart Sounds] : normal S1 and S2 [Arterial Pulses Normal] : the arterial pulses were normal [Edema] : no peripheral edema present [Respiration, Rhythm And Depth] : normal respiratory rhythm and effort [Exaggerated Use Of Accessory Muscles For Inspiration] : no accessory muscle use [Auscultation Breath Sounds / Voice Sounds] : lungs were clear to auscultation bilaterally [Abdomen Soft] : soft [Abdomen Tenderness] : non-tender [] : no hepato-splenomegaly [Abnormal Walk] : normal gait [Nail Clubbing] : no clubbing of the fingernails [Cyanosis, Localized] : no localized cyanosis [Oriented To Time, Place, And Person] : oriented to person, place, and time [Affect] : the affect was normal

## 2019-08-27 ENCOUNTER — RX RENEWAL (OUTPATIENT)
Age: 64
End: 2019-08-27

## 2019-09-20 ENCOUNTER — RX RENEWAL (OUTPATIENT)
Age: 64
End: 2019-09-20

## 2019-09-24 ENCOUNTER — APPOINTMENT (OUTPATIENT)
Dept: PULMONOLOGY | Facility: CLINIC | Age: 64
End: 2019-09-24
Payer: COMMERCIAL

## 2019-09-24 VITALS
BODY MASS INDEX: 28.25 KG/M2 | DIASTOLIC BLOOD PRESSURE: 70 MMHG | HEIGHT: 67 IN | HEART RATE: 76 BPM | WEIGHT: 180 LBS | SYSTOLIC BLOOD PRESSURE: 120 MMHG | OXYGEN SATURATION: 94 %

## 2019-09-24 PROCEDURE — 99215 OFFICE O/P EST HI 40 MIN: CPT | Mod: 25

## 2019-09-24 PROCEDURE — 99406 BEHAV CHNG SMOKING 3-10 MIN: CPT

## 2019-09-24 RX ORDER — EZETIMIBE 10 MG/1
10 TABLET ORAL
Refills: 0 | Status: DISCONTINUED | COMMUNITY
End: 2019-09-24

## 2019-09-24 RX ORDER — METOPROLOL SUCCINATE 50 MG/1
50 TABLET, EXTENDED RELEASE ORAL
Refills: 0 | Status: DISCONTINUED | COMMUNITY
End: 2019-09-24

## 2019-09-24 RX ORDER — OMEPRAZOLE 20 MG/1
20 CAPSULE, DELAYED RELEASE ORAL
Qty: 30 | Refills: 1 | Status: DISCONTINUED | COMMUNITY
Start: 2017-04-12 | End: 2019-09-24

## 2019-09-24 RX ORDER — FLECAINIDE ACETATE 100 MG/1
100 TABLET ORAL
Refills: 0 | Status: DISCONTINUED | COMMUNITY
End: 2019-09-24

## 2019-09-24 NOTE — REVIEW OF SYSTEMS
[Dry Eyes] : dryness of the eyes [Postnasal Drip] : postnasal drip [Cough] : cough [Hypertension] : ~T hypertension [Dysrhythmia] : dysrhythmia [Reflux] : reflux [Headache] : headache [Depression] : depression [Anxiety] : anxiety [As Noted in HPI] : as noted in HPI [Fever] : no fever [Chills] : no chills [Eye Irritation] : no ~T irritation of the eyes [Epistaxis] : no nosebleeds [Nasal Congestion] : no nasal congestion [Sinus Problems] : no sinus problems [Sputum] : not coughing up ~M sputum [Chest Tightness] : no chest tightness [Dyspnea] : no dyspnea [Pleuritic Pain] : no pleuritic pain [Chest Discomfort] : no chest discomfort [Wheezing] : no wheezing [Murmurs] : no murmurs were heard [Palpitations] : no palpitations [Edema] : ~T edema was not present [Hay Fever] : no hay fever [Itchy Eyes] : no itching of ~T the eyes [Nausea] : no nausea [Vomiting] : no vomiting [Constipation] : no constipation [Diarrhea] : no diarrhea [Dysuria] : no dysuria [Abdominal Pain] : no abdominal pain [Trauma] : no ~T physical trauma [Fracture] : no fracture [Dizziness] : no dizziness [Anemia] : no anemia [Syncope] : no fainting [Numbness] : no numbness [Paralysis] : no paralysis was seen [Seizures] : no seizures [Diabetes] : no diabetes mellitus [Thyroid Problem] : no thyroid problem

## 2019-09-24 NOTE — PHYSICAL EXAM
[General Appearance - Well Developed] : well developed [Normal Appearance] : normal appearance [General Appearance - In No Acute Distress] : no acute distress [Low Lying Soft Palate] : low lying soft palate [Normal Conjunctiva] : the conjunctiva exhibited no abnormalities [Enlarged Base of the Tongue] : enlargement of the base of the tongue [II] : II [Heart Rate And Rhythm] : heart rate and rhythm were normal [Heart Sounds] : normal S1 and S2 [Murmurs] : no murmurs present [Edema] : no peripheral edema present [] : no respiratory distress [Respiration, Rhythm And Depth] : normal respiratory rhythm and effort [Exaggerated Use Of Accessory Muscles For Inspiration] : no accessory muscle use [Auscultation Breath Sounds / Voice Sounds] : lungs were clear to auscultation bilaterally [Abdomen Soft] : soft [Abdomen Tenderness] : non-tender [Abnormal Walk] : normal gait [Nail Clubbing] : no clubbing of the fingernails [Cyanosis, Localized] : no localized cyanosis [No Focal Deficits] : no focal deficits [Oriented To Time, Place, And Person] : oriented to person, place, and time [Elongated Uvula] : no elongated uvula [FreeTextEntry1] : No abnormalities.

## 2019-09-24 NOTE — REASON FOR VISIT
[Initial Evaluation] : an initial evaluation [COPD] : COPD [Cough] : cough [Shortness of Breath] : shortness of Breath [Sleep Apnea] : sleep apnea [FreeTextEntry2] : smoking hx, weight issues

## 2019-09-24 NOTE — CONSULT LETTER
[Dear  ___] : Dear  [unfilled], [Consult Letter:] : I had the pleasure of evaluating your patient, [unfilled]. [Please see my note below.] : Please see my note below. [Consult Closing:] : Thank you very much for allowing me to participate in the care of this patient.  If you have any questions, please do not hesitate to contact me. [Sincerely,] : Sincerely, [FreeTextEntry3] : Prabhaakr Jane MD, FCCP, D. ABSM\par Pulmonary and Sleep Medicine\par [facility] [place]\par  [DrDevan  ___] : Dr. SINGH

## 2019-09-24 NOTE — DISCUSSION/SUMMARY
[FreeTextEntry1] : \par #1. Continue inhaler therapy per Dr. Robbins for mod COPD\par #2. Diet and exercise for weight loss\par #3. Stressed smoking cessation\par #4. Started Chantix\par #5. Referred patient to the [facility] smoking cessation program.\par #6. PSG to evaluate for possible BENTON\par #7. Cardiology f/u to optimize cardiac function\par #8. Flonase nasal spray for post nasal drip as needed \par #9. F/u after PSG

## 2019-09-24 NOTE — HISTORY OF PRESENT ILLNESS
[Initial Evaluation] : an initial evaluation of [Excessive Daytime Sleepiness] : excessive daytime sleepiness [Snoring] : snoring [Sleepy When Sedentary] : sleepy when sedentary [Unrefreshing Sleep] : unrefreshing sleep [Currently Experiencing] : The patient is currently experiencing symptoms. [None] : The patient is not currently being treated for this problem [Morning Headaches] : morning headaches [Witnessed Apnea During Sleep] : no witnessed apnea during sleep [FreeTextEntry1] : Patient is followed by Dr. Robbins for COPD and is Bevespi\par Pt is on Pepcid for GERD.\par Pt is s/p ablation for AF; has loop recorder.\par Smoking up to 2ppd, average of 1 ppd x 40 years and is down to 6 cigarettes. I spent > 3 min discussing the risks of smoking and the benefits and therapy for smoking cessation including nicotine replacement, medications, and programs.\par  [Witnessed Gasping During Sleep] : no witnessed gasping during sleep

## 2019-10-14 ENCOUNTER — RX RENEWAL (OUTPATIENT)
Age: 64
End: 2019-10-14

## 2019-11-04 ENCOUNTER — MEDICATION RENEWAL (OUTPATIENT)
Age: 64
End: 2019-11-04

## 2019-11-04 RX ORDER — VARENICLINE TARTRATE 1 MG/1
1 TABLET, FILM COATED ORAL TWICE DAILY
Qty: 3 | Refills: 1 | Status: DISCONTINUED | COMMUNITY
Start: 2019-09-24 | End: 2019-11-04

## 2019-11-04 RX ORDER — VARENICLINE TARTRATE 0.5 (11)-1
0.5 MG X 11 & KIT ORAL
Qty: 1 | Refills: 0 | Status: DISCONTINUED | COMMUNITY
Start: 2019-09-24 | End: 2019-11-04

## 2019-12-10 ENCOUNTER — OUTPATIENT (OUTPATIENT)
Dept: OUTPATIENT SERVICES | Facility: HOSPITAL | Age: 64
LOS: 1 days | End: 2019-12-10
Payer: COMMERCIAL

## 2019-12-10 DIAGNOSIS — G47.33 OBSTRUCTIVE SLEEP APNEA (ADULT) (PEDIATRIC): ICD-10-CM

## 2019-12-10 DIAGNOSIS — Z98.890 OTHER SPECIFIED POSTPROCEDURAL STATES: Chronic | ICD-10-CM

## 2019-12-10 PROCEDURE — 95810 POLYSOM 6/> YRS 4/> PARAM: CPT | Mod: 26

## 2019-12-10 PROCEDURE — 95810 POLYSOM 6/> YRS 4/> PARAM: CPT

## 2020-01-10 ENCOUNTER — APPOINTMENT (OUTPATIENT)
Dept: PULMONOLOGY | Facility: CLINIC | Age: 65
End: 2020-01-10
Payer: MEDICAID

## 2020-01-10 VITALS
OXYGEN SATURATION: 95 % | DIASTOLIC BLOOD PRESSURE: 70 MMHG | SYSTOLIC BLOOD PRESSURE: 124 MMHG | HEART RATE: 69 BPM | BODY MASS INDEX: 27.78 KG/M2 | HEIGHT: 67 IN | WEIGHT: 177 LBS

## 2020-01-10 PROCEDURE — 99215 OFFICE O/P EST HI 40 MIN: CPT | Mod: 25

## 2020-01-10 PROCEDURE — 94664 DEMO&/EVAL PT USE INHALER: CPT | Mod: 59

## 2020-01-10 PROCEDURE — G0296 VISIT TO DETERM LDCT ELIG: CPT

## 2020-01-10 PROCEDURE — 99406 BEHAV CHNG SMOKING 3-10 MIN: CPT

## 2020-01-10 PROCEDURE — 94060 EVALUATION OF WHEEZING: CPT

## 2020-01-10 RX ORDER — GLYCOPYRROLATE AND FORMOTEROL FUMARATE 9; 4.8 UG/1; UG/1
9-4.8 AEROSOL, METERED RESPIRATORY (INHALATION) TWICE DAILY
Qty: 3 | Refills: 1 | Status: DISCONTINUED | COMMUNITY
Start: 2019-02-11 | End: 2020-01-10

## 2020-01-10 RX ORDER — UMECLIDINIUM BROMIDE AND VILANTEROL TRIFENATATE 62.5; 25 UG/1; UG/1
62.5-25 POWDER RESPIRATORY (INHALATION) DAILY
Qty: 1 | Refills: 5 | Status: DISCONTINUED | COMMUNITY
Start: 2020-01-10 | End: 2020-01-10

## 2020-01-10 NOTE — REVIEW OF SYSTEMS
[Dry Eyes] : dryness of the eyes [Postnasal Drip] : postnasal drip [Cough] : cough [Hypertension] : ~T hypertension [Dysrhythmia] : dysrhythmia [Reflux] : reflux [Headache] : headache [Depression] : depression [Anxiety] : anxiety [As Noted in HPI] : as noted in HPI [Fever] : no fever [Chills] : no chills [Eye Irritation] : no ~T irritation of the eyes [Nasal Congestion] : no nasal congestion [Epistaxis] : no nosebleeds [Sinus Problems] : no sinus problems [Dyspnea] : no dyspnea [Sputum] : not coughing up ~M sputum [Chest Tightness] : no chest tightness [Pleuritic Pain] : no pleuritic pain [Wheezing] : no wheezing [Chest Discomfort] : no chest discomfort [Murmurs] : no murmurs were heard [Edema] : ~T edema was not present [Palpitations] : no palpitations [Nausea] : no nausea [Hay Fever] : no hay fever [Itchy Eyes] : no itching of ~T the eyes [Vomiting] : no vomiting [Diarrhea] : no diarrhea [Constipation] : no constipation [Abdominal Pain] : no abdominal pain [Dysuria] : no dysuria [Fracture] : no fracture [Trauma] : no ~T physical trauma [Dizziness] : no dizziness [Syncope] : no fainting [Anemia] : no anemia [Numbness] : no numbness [Paralysis] : no paralysis was seen [Seizures] : no seizures [Diabetes] : no diabetes mellitus [Thyroid Problem] : no thyroid problem

## 2020-01-10 NOTE — CONSULT LETTER
[Dear  ___] : Dear  [unfilled], [Please see my note below.] : Please see my note below. [Consult Closing:] : Thank you very much for allowing me to participate in the care of this patient.  If you have any questions, please do not hesitate to contact me. [Consult Letter:] : I had the pleasure of evaluating your patient, [unfilled]. [Sincerely,] : Sincerely, [DrDevan  ___] : Dr. SINGH [FreeTextEntry3] : Prabhakar Jane MD, FCCP, D. ABSM\par Pulmonary and Sleep Medicine\par [facility] [place]\par

## 2020-01-10 NOTE — PHYSICAL EXAM
[General Appearance - Well Developed] : well developed [General Appearance - In No Acute Distress] : no acute distress [Normal Appearance] : normal appearance [Low Lying Soft Palate] : low lying soft palate [Normal Conjunctiva] : the conjunctiva exhibited no abnormalities [II] : II [Enlarged Base of the Tongue] : enlargement of the base of the tongue [Heart Rate And Rhythm] : heart rate and rhythm were normal [Heart Sounds] : normal S1 and S2 [Murmurs] : no murmurs present [Edema] : no peripheral edema present [] : no respiratory distress [Respiration, Rhythm And Depth] : normal respiratory rhythm and effort [Exaggerated Use Of Accessory Muscles For Inspiration] : no accessory muscle use [Abdomen Soft] : soft [Auscultation Breath Sounds / Voice Sounds] : lungs were clear to auscultation bilaterally [Abdomen Tenderness] : non-tender [Nail Clubbing] : no clubbing of the fingernails [Abnormal Walk] : normal gait [No Focal Deficits] : no focal deficits [Cyanosis, Localized] : no localized cyanosis [Oriented To Time, Place, And Person] : oriented to person, place, and time [Elongated Uvula] : no elongated uvula [FreeTextEntry1] : No abnormalities.

## 2020-01-10 NOTE — DISCUSSION/SUMMARY
[FreeTextEntry1] : \par #1. Change Bvespi to Anoro given palpitations on Bvespi for moderate COPD\par #2. Diet and exercise for weight loss\par #3. Stressed smoking cessation\par #4. Nicotine replacement therapy with patches\par #5. Referred patient to the [facility] smoking cessation program.\par #6. CPAP titration study to treat moderate BENTON; otherwise could consider autoCPAP for therapy\par #7. Cardiology f/u to optimize cardiac function\par #8. Flonase nasal spray for post nasal drip as needed \par #9. Chest CT for lung cancer screening given smoking hx. Risks and benefits regarding screening CT discussed including but not limited to the benefit of early lung cancer detection as well as other possible unknown pathology but also risk of discovering nodules or other findings which may be benign but will require periodic evaluation. Prior CT revealed emphysematous changes and a peripheral 4 mm calcified nodule. Will repeat 1/20/20

## 2020-01-10 NOTE — REASON FOR VISIT
[COPD] : COPD [Cough] : cough [Sleep Apnea] : sleep apnea [Shortness of Breath] : shortness of Breath [Follow-Up] : a follow-up visit [FreeTextEntry2] : smoking hx, weight issues

## 2020-01-10 NOTE — HISTORY OF PRESENT ILLNESS
[Initial Evaluation] : an initial evaluation of [Excessive Daytime Sleepiness] : excessive daytime sleepiness [Unrefreshing Sleep] : unrefreshing sleep [Sleepy When Sedentary] : sleepy when sedentary [Snoring] : snoring [Morning Headaches] : morning headaches [Non-Productive Cough] : non-productive cough [Follow-Up - Routine Clinic] : a routine clinic follow-up of [Dyspnea on Exertion] : dyspnea on exertion [Currently Experiencing] : The patient is currently experiencing symptoms. [Long-Acting Beta Agonists] : long-acting beta agonists [Ipratropium] : ipratropium [Good Compliance] : good compliance with treatment [Good Tolerance] : good tolerance of treatment [Good Symptom Control] : good symptom control [FreeTextEntry1] : Patient is followed by Dr. Robbins for COPD and was Bevespi but d/c'd this due to palpitations.\par Pt is on Pepcid for GERD.\par Pt is s/p ablation for AF; has loop recorder.\par Smoking up to 2ppd, average of 1 ppd x 40 years and is down to 6 cigarettes. I spent > 3 min discussing the risks of smoking and the benefits and therapy for smoking cessation including nicotine replacement, medications, and programs.\par Pt was recently treated with Amoxicillin for a recent URI.\par  [Witnessed Apnea During Sleep] : no witnessed apnea during sleep [Witnessed Gasping During Sleep] : no witnessed gasping during sleep [On ___] : performed on [unfilled] [Patient] : the patient [Indication ___] : for an indication of [unfilled] [None] : no new symptoms reported [FreeTextEntry9] : Chest CT [FreeTextEntry8] : emphysematous changes and a peripheral 4 mm calcified nodule

## 2020-01-14 ENCOUNTER — TRANSCRIPTION ENCOUNTER (OUTPATIENT)
Age: 65
End: 2020-01-14

## 2020-01-15 ENCOUNTER — APPOINTMENT (OUTPATIENT)
Dept: PULMONOLOGY | Facility: CLINIC | Age: 65
End: 2020-01-15

## 2020-01-29 ENCOUNTER — FORM ENCOUNTER (OUTPATIENT)
Age: 65
End: 2020-01-29

## 2020-01-30 ENCOUNTER — APPOINTMENT (OUTPATIENT)
Dept: CT IMAGING | Facility: CLINIC | Age: 65
End: 2020-01-30

## 2020-01-30 ENCOUNTER — OUTPATIENT (OUTPATIENT)
Dept: OUTPATIENT SERVICES | Facility: HOSPITAL | Age: 65
LOS: 1 days | End: 2020-01-30
Payer: MEDICAID

## 2020-01-30 VITALS — WEIGHT: 176 LBS | BODY MASS INDEX: 27.62 KG/M2 | HEIGHT: 67 IN

## 2020-01-30 DIAGNOSIS — Z87.891 PERSONAL HISTORY OF NICOTINE DEPENDENCE: ICD-10-CM

## 2020-01-30 DIAGNOSIS — Z98.890 OTHER SPECIFIED POSTPROCEDURAL STATES: Chronic | ICD-10-CM

## 2020-01-30 PROCEDURE — G0297: CPT

## 2020-01-30 PROCEDURE — G0297: CPT | Mod: 26

## 2020-01-30 NOTE — PLAN
[Smoking Cessation Guidance Provided] : Smoking cessation guidance was provided to patient [FreeTextEntry1] : - Low Dose CT chest for lung cancer screening.\par \par - Encouraged smoking cessation\par \par - Continue Nicotine patch 21mg\par \par -Add nicotine throat lozenger for individual cravings throughout the day.

## 2020-01-30 NOTE — REASON FOR VISIT
[Annual Follow-Up] : an annual follow-up visit [Review of Eligibility] : review of eligibility [Face-to-Face Visit] : face-to-face visit

## 2020-01-30 NOTE — ASSESSMENT
[Discussed Risks and Advised to Quit Smoking] : Discussed risks and advised to quit smoking [Ready] : Patient is ready for cessation intervention [Discussed Cessation Strategies] : cessation strategies were discussed [Action] : Action: The patient is actively trying to quit smoking or has quit smoking in the past 6 months

## 2020-01-30 NOTE — HISTORY OF PRESENT ILLNESS
[TextBox_13] : Referred by Dr. Prabhakar Jane.\par \par Ms. MESSINA is a 64 year old female with a history of CAD, COPD and emphysema.\par \par She was called today to review eligibility for Low-Dose CT lung cancer screening.  Reviewed and confirmed that the patient meets screening eligibility criteria:\par \par 64 years old \par \par Smoking Status: Current Smoker\par \par Number of pack(s) per day: 1\par Number of years smoked: 47\par Number of pack years smokin\par Patient recent cut down to 3 cigarettes daily using NRT.\par \par Ms. MESSINA denies any symptoms of lung cancer, including new cough, change in cough, hemoptysis, and unintentional weight loss.\par \par Ms. MESSINA denies any personal history of lung cancer.  No lung cancer in a first degree relative.  Denies any history of occupational exposures.

## 2020-01-30 NOTE — DATA REVIEWED
[Lung Cancer Screening] : Patient underwent lung cancer screening [1] : 1 [TextBox_12] : 04/17 [TextBox_27] : 01/19

## 2020-03-12 ENCOUNTER — RX RENEWAL (OUTPATIENT)
Age: 65
End: 2020-03-12

## 2020-07-24 RX ORDER — NICOTINE 21 MG/24HR
21 PATCH, TRANSDERMAL 24 HOURS TRANSDERMAL DAILY
Qty: 30 | Refills: 0 | Status: DISCONTINUED | COMMUNITY
Start: 2020-02-05 | End: 2020-07-24

## 2020-07-24 RX ORDER — NICOTINE TRANSDERMAL SYSTEM 21 MG/24H
21 PATCH, EXTENDED RELEASE TRANSDERMAL DAILY
Qty: 1 | Refills: 0 | Status: DISCONTINUED | COMMUNITY
Start: 2019-11-04 | End: 2020-07-24

## 2020-07-24 RX ORDER — TIOTROPIUM BROMIDE AND OLODATEROL 3.124; 2.736 UG/1; UG/1
2.5-2.5 SPRAY, METERED RESPIRATORY (INHALATION) DAILY
Qty: 3 | Refills: 2 | Status: DISCONTINUED | COMMUNITY
Start: 2020-01-10 | End: 2020-07-24

## 2020-07-24 RX ORDER — RANITIDINE 300 MG/1
300 TABLET ORAL
Qty: 90 | Refills: 1 | Status: DISCONTINUED | COMMUNITY
Start: 2019-03-21 | End: 2020-07-24

## 2020-07-24 RX ORDER — NICOTINE POLACRILEX 2 MG/1
2 LOZENGE ORAL
Qty: 1 | Refills: 0 | Status: DISCONTINUED | COMMUNITY
Start: 2020-01-30 | End: 2020-07-24

## 2020-08-31 ENCOUNTER — RX RENEWAL (OUTPATIENT)
Age: 65
End: 2020-08-31

## 2020-09-09 ENCOUNTER — RX RENEWAL (OUTPATIENT)
Age: 65
End: 2020-09-09

## 2020-09-11 ENCOUNTER — APPOINTMENT (OUTPATIENT)
Dept: PULMONOLOGY | Facility: CLINIC | Age: 65
End: 2020-09-11
Payer: COMMERCIAL

## 2020-09-11 VITALS
HEART RATE: 67 BPM | WEIGHT: 187 LBS | OXYGEN SATURATION: 95 % | HEIGHT: 66 IN | DIASTOLIC BLOOD PRESSURE: 76 MMHG | RESPIRATION RATE: 16 BRPM | SYSTOLIC BLOOD PRESSURE: 126 MMHG | BODY MASS INDEX: 30.05 KG/M2

## 2020-09-11 PROCEDURE — 99214 OFFICE O/P EST MOD 30 MIN: CPT | Mod: 25

## 2020-09-11 PROCEDURE — G0296 VISIT TO DETERM LDCT ELIG: CPT

## 2020-09-11 PROCEDURE — 99406 BEHAV CHNG SMOKING 3-10 MIN: CPT

## 2020-09-11 RX ORDER — NICOTINE TRANSDERMAL SYSTEM 7 MG/24H
7 PATCH, EXTENDED RELEASE TRANSDERMAL DAILY
Qty: 1 | Refills: 0 | Status: DISCONTINUED | COMMUNITY
Start: 2019-11-04 | End: 2020-09-11

## 2020-09-11 RX ORDER — DILTIAZEM HYDROCHLORIDE 120 MG/1
120 CAPSULE, EXTENDED RELEASE ORAL
Refills: 0 | Status: DISCONTINUED | COMMUNITY
End: 2020-09-11

## 2020-09-11 RX ORDER — APIXABAN 5 MG/1
5 TABLET, FILM COATED ORAL
Refills: 0 | Status: DISCONTINUED | COMMUNITY
End: 2020-09-11

## 2020-09-11 RX ORDER — NICOTINE TRANSDERMAL SYSTEM 14 MG/24H
14 PATCH, EXTENDED RELEASE TRANSDERMAL DAILY
Qty: 1 | Refills: 0 | Status: DISCONTINUED | COMMUNITY
Start: 2019-11-04 | End: 2020-09-11

## 2020-09-11 NOTE — DISCUSSION/SUMMARY
[FreeTextEntry1] : \par #1. Change Bvespi to Anoro given palpitations on Bvespi for moderate COPD\par #2. Diet and exercise for weight loss\par #3. Stressed smoking cessation\par #4. Nicotine replacement therapy with patches\par #5. Referred patient to the [facility] smoking cessation program.\par #6. CPAP titration study to treat moderate BENTON; otherwise could consider autoCPAP for therapy\par #7. Cardiology f/u to optimize cardiac function\par #8. Flonase nasal spray for post nasal drip as needed \par #9. Chest CT for lung cancer screening given smoking hx. Risks and benefits regarding screening CT discussed including but not limited to the benefit of early lung cancer detection as well as other possible unknown pathology but also risk of discovering nodules or other findings which may be benign but will require periodic evaluation. Prior CT revealed emphysematous changes and a peripheral 4 mm calcified nodule. Will repeat 2/2021\par #10. Reviewed risks of exposure and symptoms of Covid-19 virus, including how the virus is spread.\par \par Discussed the following risk-reducing strategies:\par -Wash hands with soap and water (proper technique reviewed) \par -Use alcohol based  when hand-washing is not an option\par -Maintain a social distance of at least 6 feet whenever possible\par -Avoid contact, especially hand shaking\par -Avoid touching eyes, nose, and mouth\par -Cover face/mouth when coughing or sneezing\par -Avoid close contact with sick people\par -Seek medical help if you develop a fever and/or respiratory symptoms (e.g. cough, chest pain, SOB)\par \par Patient's questions were answered and patient appears to understand these recommendations

## 2020-09-11 NOTE — REASON FOR VISIT
[Follow-Up] : a follow-up visit [COPD] : COPD [Shortness of Breath] : shortness of Breath [Cough] : cough [FreeTextEntry2] : smoking hx, weight issues [Sleep Apnea] : sleep apnea

## 2020-09-11 NOTE — COUNSELING
[Cessation strategies including cessation program discussed] : Cessation strategies including cessation program discussed [Risk of tobacco use and health benefits of smoking cessation discussed] : Risk of tobacco use and health benefits of smoking cessation discussed [Encouraged to pick a quit date and identify support needed to quit] : Encouraged to pick a quit date and identify support needed to quit [Use of nicotine replacement therapies and other medications discussed] : Use of nicotine replacement therapies and other medications discussed [FreeTextEntry1] : 4 [Potential consequences of obesity discussed] : Potential consequences of obesity discussed [Tobacco Use Cessation Intermediate Greater Than 3 Minutes Up to 10 Minutes] : Tobacco Use Cessation Intermediate Greater Than 3 Minutes Up to 10 Minutes [Benefits of weight loss discussed] : Benefits of weight loss discussed [Encouraged to increase physical activity] : Encouraged to increase physical activity [ - Annual Lung Cancer Screening/Share Decision Making Discussion] : Annual Lung Cancer Screening/Share Decision Making Discussion. (I have advised this patient to have a Low Dose CT (LDCT) scan of the lungs and have discussed the following with the patient in a shared decision making discussion:   Benefits of Detection and Early Treatment: There is adequate evidence that annual screening for lung cancer with LDCT in a population of high-risk persons can prevent a substantial number of lung cancer–related deaths. The magnitude of benefit depends on the individual patient's risk for lung cancer, as those who are at highest risk are most likely to benefit. Screening cannot prevent most lung cancer–related deaths, and does not replace smoking cessation. Harms of Detection and Early Intervention and Treatment: The harms associated with LDCT screening include false-negative and false-positive results, incidental findings, over diagnosis, and radiation exposure. False-positive LDCT results occur in a substantial proportion of screened persons; 95% of all positive results do not lead to a diagnosis of cancer. In a high-quality screening program, further imaging can resolve most false-positive results; however, some patients may require invasive procedures. Radiation harms, including cancer resulting from cumulative exposure to radiation, vary depending on the age at the start of screening; the number of scans received; and the person's exposure to other sources of radiation, particularly other medical imaging.)

## 2020-09-11 NOTE — HISTORY OF PRESENT ILLNESS
[Initial Evaluation] : an initial evaluation of [FreeTextEntry1] : Patient is followed by Dr. Robbins for COPD and was Bevespi but d/c'd this due to palpitations.\par Pt is on Pepcid for GERD.\par Pt is s/p ablation for AF; has loop recorder.\par Smoking up to 2ppd, average of 1 ppd x 40 years and is down to 6 cigarettes. I spent > 3 min discussing the risks of smoking and the benefits and therapy for smoking cessation including nicotine replacement, medications, and programs.\par Pt was recently treated with Amoxicillin for a recent URI.\par  [Excessive Daytime Sleepiness] : excessive daytime sleepiness [Witnessed Apnea During Sleep] : no witnessed apnea during sleep [Witnessed Gasping During Sleep] : no witnessed gasping during sleep [Snoring] : snoring [Sleepy When Sedentary] : sleepy when sedentary [Unrefreshing Sleep] : unrefreshing sleep [Morning Headaches] : morning headaches [Follow-Up - Routine Clinic] : a routine clinic follow-up of [Non-Productive Cough] : non-productive cough [Currently Experiencing] : The patient is currently experiencing symptoms. [Dyspnea on Exertion] : dyspnea on exertion [Long-Acting Beta Agonists] : long-acting beta agonists [Good Tolerance] : good tolerance of treatment [Good Compliance] : good compliance with treatment [Ipratropium] : ipratropium [On ___] : performed on [unfilled] [Good Symptom Control] : good symptom control [Patient] : the patient [Indication ___] : for an indication of [unfilled] [None] : no new symptoms reported [FreeTextEntry9] : Chest CT [FreeTextEntry8] : emphysematous changes and a peripheral 4 mm calcified nodule

## 2020-09-11 NOTE — REVIEW OF SYSTEMS
[Fever] : no fever [Chills] : no chills [Dry Eyes] : dryness of the eyes [Epistaxis] : no nosebleeds [Eye Irritation] : no ~T irritation of the eyes [Nasal Congestion] : no nasal congestion [Postnasal Drip] : postnasal drip [Sinus Problems] : no sinus problems [Sputum] : not coughing up ~M sputum [Cough] : cough [Dyspnea] : no dyspnea [Chest Tightness] : no chest tightness [Pleuritic Pain] : no pleuritic pain [Wheezing] : no wheezing [Hypertension] : ~T hypertension [Chest Discomfort] : no chest discomfort [Dysrhythmia] : dysrhythmia [Murmurs] : no murmurs were heard [Palpitations] : no palpitations [Hay Fever] : no hay fever [Edema] : ~T edema was not present [Itchy Eyes] : no itching of ~T the eyes [Nausea] : no nausea [Reflux] : reflux [Constipation] : no constipation [Vomiting] : no vomiting [Diarrhea] : no diarrhea [Abdominal Pain] : no abdominal pain [Dysuria] : no dysuria [Trauma] : no ~T physical trauma [Fracture] : no fracture [Headache] : headache [Anemia] : no anemia [Dizziness] : no dizziness [Syncope] : no fainting [Numbness] : no numbness [Paralysis] : no paralysis was seen [Seizures] : no seizures [Anxiety] : anxiety [Depression] : depression [Thyroid Problem] : no thyroid problem [Diabetes] : no diabetes mellitus [As Noted in HPI] : as noted in HPI

## 2020-09-11 NOTE — PHYSICAL EXAM
[General Appearance - Well Developed] : well developed [Normal Appearance] : normal appearance [General Appearance - In No Acute Distress] : no acute distress [Normal Conjunctiva] : the conjunctiva exhibited no abnormalities [Low Lying Soft Palate] : low lying soft palate [Elongated Uvula] : no elongated uvula [Enlarged Base of the Tongue] : enlargement of the base of the tongue [II] : II [Heart Rate And Rhythm] : heart rate and rhythm were normal [Heart Sounds] : normal S1 and S2 [Murmurs] : no murmurs present [] : no respiratory distress [Edema] : no peripheral edema present [Respiration, Rhythm And Depth] : normal respiratory rhythm and effort [Exaggerated Use Of Accessory Muscles For Inspiration] : no accessory muscle use [Auscultation Breath Sounds / Voice Sounds] : lungs were clear to auscultation bilaterally [FreeTextEntry1] : No abnormalities. [Abdomen Soft] : soft [Abdomen Tenderness] : non-tender [Cyanosis, Localized] : no localized cyanosis [Nail Clubbing] : no clubbing of the fingernails [Abnormal Walk] : normal gait [No Focal Deficits] : no focal deficits [Oriented To Time, Place, And Person] : oriented to person, place, and time

## 2020-09-11 NOTE — CONSULT LETTER
[Dear  ___] : Dear  [unfilled], [Consult Letter:] : I had the pleasure of evaluating your patient, [unfilled]. [Please see my note below.] : Please see my note below. [Consult Closing:] : Thank you very much for allowing me to participate in the care of this patient.  If you have any questions, please do not hesitate to contact me. [FreeTextEntry3] : Prabhakar Jane MD, FCCP, D. ABSM\par Pulmonary and Sleep Medicine\par [facility] [place]\par  [Sincerely,] : Sincerely, [DrDevan  ___] : Dr. SINGH

## 2021-03-21 ENCOUNTER — RX RENEWAL (OUTPATIENT)
Age: 66
End: 2021-03-21

## 2021-03-22 ENCOUNTER — RX RENEWAL (OUTPATIENT)
Age: 66
End: 2021-03-22

## 2021-04-08 ENCOUNTER — RX RENEWAL (OUTPATIENT)
Age: 66
End: 2021-04-08

## 2021-08-13 NOTE — DISCHARGE NOTE ADULT - NS AS DC AMI YN
Finger is wrapped with occlusive dressing and splinted.  Wound is clean.  Pt reports no feelings of pain or discomfort.  
Patient was offered professional  and declined, stating he would prefer to use his coworker who accompanied him to the ED.  
no

## 2021-09-23 ENCOUNTER — APPOINTMENT (OUTPATIENT)
Dept: PULMONOLOGY | Facility: CLINIC | Age: 66
End: 2021-09-23
Payer: MEDICARE

## 2021-09-23 VITALS — SYSTOLIC BLOOD PRESSURE: 120 MMHG | WEIGHT: 186 LBS | BODY MASS INDEX: 30.02 KG/M2 | DIASTOLIC BLOOD PRESSURE: 70 MMHG

## 2021-09-23 VITALS — RESPIRATION RATE: 16 BRPM | HEART RATE: 56 BPM | OXYGEN SATURATION: 95 %

## 2021-09-23 PROCEDURE — 99406 BEHAV CHNG SMOKING 3-10 MIN: CPT

## 2021-09-23 PROCEDURE — 99214 OFFICE O/P EST MOD 30 MIN: CPT | Mod: 25

## 2021-09-23 PROCEDURE — G0296 VISIT TO DETERM LDCT ELIG: CPT

## 2021-09-23 RX ORDER — DILTIAZEM HYDROCHLORIDE 360 MG/1
CAPSULE, COATED, EXTENDED RELEASE ORAL
Refills: 0 | Status: ACTIVE | COMMUNITY

## 2021-09-23 RX ORDER — ALPRAZOLAM 0.25 MG/1
0.25 TABLET ORAL
Refills: 0 | Status: COMPLETED | COMMUNITY
End: 2021-09-23

## 2021-09-23 RX ORDER — ROFLUMILAST 500 UG/1
500 TABLET ORAL
Refills: 0 | Status: COMPLETED | COMMUNITY
End: 2021-09-23

## 2021-09-23 NOTE — COUNSELING
[Risk of tobacco use and health benefits of smoking cessation discussed] : Risk of tobacco use and health benefits of smoking cessation discussed [Cessation strategies including cessation program discussed] : Cessation strategies including cessation program discussed [Use of nicotine replacement therapies and other medications discussed] : Use of nicotine replacement therapies and other medications discussed [Encouraged to pick a quit date and identify support needed to quit] : Encouraged to pick a quit date and identify support needed to quit [Potential consequences of obesity discussed] : Potential consequences of obesity discussed [Benefits of weight loss discussed] : Benefits of weight loss discussed [Encouraged to increase physical activity] : Encouraged to increase physical activity [Tobacco Use Cessation Intermediate Greater Than 3 Minutes Up to 10 Minutes] : Tobacco Use Cessation Intermediate Greater Than 3 Minutes Up to 10 Minutes [FreeTextEntry1] : 4 [ - Annual Lung Cancer Screening/Share Decision Making Discussion] : Annual Lung Cancer Screening/Share Decision Making Discussion. (I have advised this patient to have a Low Dose CT (LDCT) scan of the lungs and have discussed the following with the patient in a shared decision making discussion:   Benefits of Detection and Early Treatment: There is adequate evidence that annual screening for lung cancer with LDCT in a population of high-risk persons can prevent a substantial number of lung cancer–related deaths. The magnitude of benefit depends on the individual patient's risk for lung cancer, as those who are at highest risk are most likely to benefit. Screening cannot prevent most lung cancer–related deaths, and does not replace smoking cessation. Harms of Detection and Early Intervention and Treatment: The harms associated with LDCT screening include false-negative and false-positive results, incidental findings, over diagnosis, and radiation exposure. False-positive LDCT results occur in a substantial proportion of screened persons; 95% of all positive results do not lead to a diagnosis of cancer. In a high-quality screening program, further imaging can resolve most false-positive results; however, some patients may require invasive procedures. Radiation harms, including cancer resulting from cumulative exposure to radiation, vary depending on the age at the start of screening; the number of scans received; and the person's exposure to other sources of radiation, particularly other medical imaging.)

## 2021-09-23 NOTE — HISTORY OF PRESENT ILLNESS
[Initial Evaluation] : an initial evaluation of [Excessive Daytime Sleepiness] : excessive daytime sleepiness [Snoring] : snoring [Unrefreshing Sleep] : unrefreshing sleep [Sleepy When Sedentary] : sleepy when sedentary [Morning Headaches] : morning headaches [Follow-Up - Routine Clinic] : a routine clinic follow-up of [Dyspnea on Exertion] : dyspnea on exertion [Non-Productive Cough] : non-productive cough [Currently Experiencing] : The patient is currently experiencing symptoms. [Long-Acting Beta Agonists] : long-acting beta agonists [Ipratropium] : ipratropium [Good Compliance] : good compliance with treatment [Good Tolerance] : good tolerance of treatment [Good Symptom Control] : good symptom control [On ___] : performed on [unfilled] [Patient] : the patient [Indication ___] : for an indication of [unfilled] [None] : no new symptoms reported [FreeTextEntry1] : Pt is on Anoro for COPD as Bvespi caused palpitations.\par Pt is on Pepcid for GERD.\par Pt is s/p ablation for AF\par Smoking up to 2ppd, average of 1 ppd x 40 years and is down to 6 cigarettes. I spent > 3 min discussing the risks of smoking and the benefits and therapy for smoking cessation including nicotine replacement, medications, and programs.\par  [Witnessed Apnea During Sleep] : no witnessed apnea during sleep [Witnessed Gasping During Sleep] : no witnessed gasping during sleep [FreeTextEntry9] : Chest CT [FreeTextEntry8] : emphysematous changes and a peripheral 4 mm calcified nodule

## 2021-09-23 NOTE — REASON FOR VISIT
[Follow-Up] : a follow-up visit [Sleep Apnea] : sleep apnea [Cough] : cough [COPD] : COPD [Shortness of Breath] : shortness of breath [Obesity] : obesity [Nicotine Dependence] : nicotine dependence

## 2021-09-23 NOTE — DISCUSSION/SUMMARY
[FreeTextEntry1] : \par #1. Change Bvespi to Anoro given palpitations on Bvespi for moderate COPD\par #2. Diet and exercise for weight loss\par #3. Stressed smoking cessation\par #4. Nicotine replacement therapy with patches\par #5. Referred patient to the [facility] smoking cessation program in the past\par #6. CPAP titration study to treat moderate BENTON; otherwise could consider autoCPAP for therapy but pt does not want to proceed with therapy. The patient understands the risks of untreated BENTON including heart disease, strokes, hypertension, pulmonary hypertension, and motor vehicle accidents as well as the need for treatment and weight loss. \par #7. Cardiology f/u to optimize cardiac function\par #8. Flonase nasal spray for post nasal drip as needed \par #9. Chest CT for lung cancer screening given smoking hx. Risks and benefits regarding screening CT discussed including but not limited to the benefit of early lung cancer detection as well as other possible unknown pathology but also risk of discovering nodules or other findings which may be benign but will require periodic evaluation. Prior CT revealed emphysematous changes and a peripheral 4 mm calcified nodule. Will repeat now\par #10. F/u in 2 months with chest CT and PFTs\par #11. Pt had both Covid vaccines \par #12. Reviewed risks of exposure and symptoms of Covid-19 virus, including how the virus is spread and precautions to avoid jimbo virus.\par \par Patient's questions were answered and patient appears to understand these recommendations

## 2021-09-23 NOTE — CONSULT LETTER
[Dear  ___] : Dear  [unfilled], [Consult Letter:] : I had the pleasure of evaluating your patient, [unfilled]. [Please see my note below.] : Please see my note below. [Consult Closing:] : Thank you very much for allowing me to participate in the care of this patient.  If you have any questions, please do not hesitate to contact me. [Sincerely,] : Sincerely, [DrDevan  ___] : Dr. SINGH [FreeTextEntry3] : Prabhakar Jane MD, FCCP, D. ABSM\par Pulmonary and Sleep Medicine\par [facility] [place]\par

## 2021-09-23 NOTE — PHYSICAL EXAM
[General Appearance - Well Developed] : well developed [Normal Appearance] : normal appearance [General Appearance - In No Acute Distress] : no acute distress [Normal Conjunctiva] : the conjunctiva exhibited no abnormalities [Low Lying Soft Palate] : low lying soft palate [Enlarged Base of the Tongue] : enlargement of the base of the tongue [II] : II [Heart Rate And Rhythm] : heart rate and rhythm were normal [Heart Sounds] : normal S1 and S2 [Murmurs] : no murmurs present [Edema] : no peripheral edema present [] : no respiratory distress [Respiration, Rhythm And Depth] : normal respiratory rhythm and effort [Exaggerated Use Of Accessory Muscles For Inspiration] : no accessory muscle use [Auscultation Breath Sounds / Voice Sounds] : lungs were clear to auscultation bilaterally [Abdomen Soft] : soft [Abdomen Tenderness] : non-tender [Abnormal Walk] : normal gait [Nail Clubbing] : no clubbing of the fingernails [Cyanosis, Localized] : no localized cyanosis [No Focal Deficits] : no focal deficits [Oriented To Time, Place, And Person] : oriented to person, place, and time [Elongated Uvula] : no elongated uvula [FreeTextEntry1] : No abnormalities.

## 2021-09-23 NOTE — REVIEW OF SYSTEMS
[Dry Eyes] : dryness of the eyes [Postnasal Drip] : postnasal drip [Cough] : cough [Hypertension] : ~T hypertension [Dysrhythmia] : dysrhythmia [Reflux] : reflux [Headache] : headache [Depression] : depression [Anxiety] : anxiety [As Noted in HPI] : as noted in HPI [Fever] : no fever [Chills] : no chills [Eye Irritation] : no ~T irritation of the eyes [Nasal Congestion] : no nasal congestion [Epistaxis] : no nosebleeds [Sinus Problems] : no sinus problems [Sputum] : not coughing up ~M sputum [Dyspnea] : no dyspnea [Chest Tightness] : no chest tightness [Pleuritic Pain] : no pleuritic pain [Wheezing] : no wheezing [Chest Discomfort] : no chest discomfort [Murmurs] : no murmurs were heard [Palpitations] : no palpitations [Edema] : ~T edema was not present [Hay Fever] : no hay fever [Itchy Eyes] : no itching of ~T the eyes [Nausea] : no nausea [Vomiting] : no vomiting [Constipation] : no constipation [Diarrhea] : no diarrhea [Abdominal Pain] : no abdominal pain [Dysuria] : no dysuria [Trauma] : no ~T physical trauma [Fracture] : no fracture [Anemia] : no anemia [Dizziness] : no dizziness [Syncope] : no fainting [Numbness] : no numbness [Paralysis] : no paralysis was seen [Seizures] : no seizures [Diabetes] : no diabetes mellitus [Thyroid Problem] : no thyroid problem

## 2021-10-12 ENCOUNTER — APPOINTMENT (OUTPATIENT)
Dept: CT IMAGING | Facility: CLINIC | Age: 66
End: 2021-10-12

## 2021-10-28 ENCOUNTER — OUTPATIENT (OUTPATIENT)
Dept: OUTPATIENT SERVICES | Facility: HOSPITAL | Age: 66
LOS: 1 days | End: 2021-10-28

## 2021-10-28 ENCOUNTER — APPOINTMENT (OUTPATIENT)
Dept: CT IMAGING | Facility: CLINIC | Age: 66
End: 2021-10-28
Payer: MEDICARE

## 2021-10-28 DIAGNOSIS — Z87.891 PERSONAL HISTORY OF NICOTINE DEPENDENCE: ICD-10-CM

## 2021-10-28 DIAGNOSIS — Z98.890 OTHER SPECIFIED POSTPROCEDURAL STATES: Chronic | ICD-10-CM

## 2021-10-28 PROCEDURE — 71271 CT THORAX LUNG CANCER SCR C-: CPT | Mod: 26

## 2021-11-04 DIAGNOSIS — Z01.818 ENCOUNTER FOR OTHER PREPROCEDURAL EXAMINATION: ICD-10-CM

## 2021-11-13 ENCOUNTER — APPOINTMENT (OUTPATIENT)
Dept: DISASTER EMERGENCY | Facility: CLINIC | Age: 66
End: 2021-11-13

## 2021-11-24 ENCOUNTER — NON-APPOINTMENT (OUTPATIENT)
Age: 66
End: 2021-11-24

## 2021-12-12 ENCOUNTER — RX RENEWAL (OUTPATIENT)
Age: 66
End: 2021-12-12

## 2021-12-19 LAB — SARS-COV-2 N GENE NPH QL NAA+PROBE: NOT DETECTED

## 2021-12-21 ENCOUNTER — APPOINTMENT (OUTPATIENT)
Dept: PULMONOLOGY | Facility: CLINIC | Age: 66
End: 2021-12-21
Payer: MEDICARE

## 2021-12-21 VITALS — BODY MASS INDEX: 30.22 KG/M2 | HEIGHT: 66 IN | WEIGHT: 188 LBS

## 2021-12-21 VITALS
DIASTOLIC BLOOD PRESSURE: 68 MMHG | SYSTOLIC BLOOD PRESSURE: 134 MMHG | RESPIRATION RATE: 16 BRPM | HEART RATE: 80 BPM | OXYGEN SATURATION: 91 %

## 2021-12-21 PROCEDURE — 99406 BEHAV CHNG SMOKING 3-10 MIN: CPT

## 2021-12-21 PROCEDURE — 85018 HEMOGLOBIN: CPT | Mod: QW

## 2021-12-21 PROCEDURE — 99214 OFFICE O/P EST MOD 30 MIN: CPT | Mod: 25

## 2021-12-21 PROCEDURE — 94729 DIFFUSING CAPACITY: CPT

## 2021-12-21 PROCEDURE — 94727 GAS DIL/WSHOT DETER LNG VOL: CPT

## 2021-12-21 PROCEDURE — 94010 BREATHING CAPACITY TEST: CPT

## 2021-12-21 RX ORDER — APIXABAN 5 MG/1
5 TABLET, FILM COATED ORAL
Refills: 0 | Status: ACTIVE | COMMUNITY

## 2021-12-21 RX ORDER — IPRATROPIUM BROMIDE AND ALBUTEROL 20; 100 UG/1; UG/1
20-100 SPRAY, METERED RESPIRATORY (INHALATION)
Qty: 3 | Refills: 1 | Status: DISCONTINUED | COMMUNITY
Start: 2021-09-23 | End: 2021-12-21

## 2021-12-21 NOTE — PROCEDURE
[FreeTextEntry1] : Natan from 3/21/19 - moderate COPD\par PFTs 12/21/21 - Normal FVC and moderately reduced FEV1 with an obstructive pattern; lung volumes are normal with a moderately reduced diffusion capacity; overall, near baseline.

## 2021-12-21 NOTE — HISTORY OF PRESENT ILLNESS
[Initial Evaluation] : an initial evaluation of [Excessive Daytime Sleepiness] : excessive daytime sleepiness [Snoring] : snoring [Unrefreshing Sleep] : unrefreshing sleep [Sleepy When Sedentary] : sleepy when sedentary [Morning Headaches] : morning headaches [Follow-Up - Routine Clinic] : a routine clinic follow-up of [Dyspnea on Exertion] : dyspnea on exertion [Non-Productive Cough] : non-productive cough [Currently Experiencing] : The patient is currently experiencing symptoms. [Long-Acting Beta Agonists] : long-acting beta agonists [Ipratropium] : ipratropium [Good Compliance] : good compliance with treatment [Good Tolerance] : good tolerance of treatment [Good Symptom Control] : good symptom control [On ___] : performed on [unfilled] [Patient] : the patient [Indication ___] : for an indication of [unfilled] [None] : no new symptoms reported [FreeTextEntry1] : Pt is on Anoro for COPD as Bvespi caused palpitations.\par Pt is on Pepcid for GERD.\par Pt is s/p ablation for AF\par Smoking up to 2ppd, average of 1 ppd x 40 years and is down to 6-8 cigarettes. I spent > 3 min discussing the risks of smoking and the benefits and therapy for smoking cessation including nicotine replacement, medications, and programs.\par Pt with recent fall with torn ligaments in R ankle. Pt was in boot for 9 weeks and is now off the boot and going physical therapy.\par \par  [Witnessed Apnea During Sleep] : no witnessed apnea during sleep [Witnessed Gasping During Sleep] : no witnessed gasping during sleep [FreeTextEntry9] : Chest CT [FreeTextEntry8] : emphysematous changes and a peripheral 4 mm calcified nodule

## 2021-12-21 NOTE — COUNSELING
[Potential consequences of obesity discussed] : Potential consequences of obesity discussed [Benefits of weight loss discussed] : Benefits of weight loss discussed [Encouraged to increase physical activity] : Encouraged to increase physical activity [Use of nicotine replacement therapies and other medications discussed] : Use of nicotine replacement therapies and other medications discussed [Encouraged to pick a quit date and identify support needed to quit] : Encouraged to pick a quit date and identify support needed to quit [Smoking Cessation Program Referral] : Smoking Cessation Program Referral  [Yes] : Willing to quit smoking [FreeTextEntry1] : 4

## 2021-12-21 NOTE — CONSULT LETTER
[Dear  ___] : Dear  [unfilled], [Consult Letter:] : I had the pleasure of evaluating your patient, [unfilled]. [Please see my note below.] : Please see my note below. [Consult Closing:] : Thank you very much for allowing me to participate in the care of this patient.  If you have any questions, please do not hesitate to contact me. [Sincerely,] : Sincerely, [DrDevan  ___] : Dr. SINGH [FreeTextEntry3] : Prabhakar Jane MD, FCCP, D. ABSM\par Pulmonary and Sleep Medicine\par [facility] [place]\par

## 2021-12-21 NOTE — DISCUSSION/SUMMARY
[FreeTextEntry1] : \par #1. Change Bvespi to Anoro given palpitations on Bvespi for moderate COPD; PFTs near baseline\par #2. Diet and exercise for weight loss\par #3. Stressed smoking cessation\par #4. Nicotine replacement therapy with patches\par #5. Referred patient to the [facility] smoking cessation program in the past\par #6. Offered CPAP titration study to treat moderate BENTON; otherwise could consider autoCPAP for therapy but pt does not want to proceed with therapy. The patient understands the risks of untreated BENTON including heart disease, strokes, hypertension, pulmonary hypertension, and motor vehicle accidents as well as the need for treatment and weight loss. \par #7. Cardiology f/u to optimize cardiac function\par #8. Flonase nasal spray for post nasal drip as needed \par #9. Chest CT for lung cancer screening given smoking hx. Risks and benefits regarding screening CT discussed including but not limited to the benefit of early lung cancer detection as well as other possible unknown pathology but also risk of discovering nodules or other findings which may be benign but will require periodic evaluation. Prior CT revealed emphysematous changes and a peripheral 4 mm calcified nodule. Will repeat annually\par #10. F/u in 4 months \par #11. Pt had both Covid vaccines and booster; rec flu vaccine\par #12. Reviewed risks of exposure and symptoms of Covid-19 virus, including how the virus is spread and precautions to avoid jimbo virus.\par \par Patient's questions were answered and patient appears to understand these recommendations

## 2022-04-19 ENCOUNTER — APPOINTMENT (OUTPATIENT)
Dept: PULMONOLOGY | Facility: CLINIC | Age: 67
End: 2022-04-19

## 2022-04-29 ENCOUNTER — APPOINTMENT (OUTPATIENT)
Dept: PULMONOLOGY | Facility: CLINIC | Age: 67
End: 2022-04-29

## 2022-11-15 ENCOUNTER — APPOINTMENT (OUTPATIENT)
Dept: PULMONOLOGY | Facility: CLINIC | Age: 67
End: 2022-11-15

## 2022-11-15 VITALS
WEIGHT: 181 LBS | RESPIRATION RATE: 16 BRPM | SYSTOLIC BLOOD PRESSURE: 138 MMHG | DIASTOLIC BLOOD PRESSURE: 80 MMHG | HEART RATE: 81 BPM | OXYGEN SATURATION: 93 % | BODY MASS INDEX: 29.21 KG/M2

## 2022-11-15 PROCEDURE — 99406 BEHAV CHNG SMOKING 3-10 MIN: CPT

## 2022-11-15 PROCEDURE — 99214 OFFICE O/P EST MOD 30 MIN: CPT | Mod: 25

## 2022-11-15 PROCEDURE — G0296 VISIT TO DETERM LDCT ELIG: CPT

## 2022-11-15 RX ORDER — ALPRAZOLAM 1 MG/1
1 TABLET ORAL
Qty: 90 | Refills: 0 | Status: ACTIVE | COMMUNITY
Start: 2022-10-12

## 2022-11-15 RX ORDER — NYSTATIN 100000 [USP'U]/G
100000 POWDER TOPICAL
Qty: 30 | Refills: 0 | Status: ACTIVE | COMMUNITY
Start: 2022-10-27

## 2022-11-15 RX ORDER — ALBUTEROL SULFATE 90 UG/1
108 (90 BASE) AEROSOL, METERED RESPIRATORY (INHALATION)
Qty: 1 | Refills: 5 | Status: ACTIVE | COMMUNITY
Start: 2017-04-12 | End: 1900-01-01

## 2022-11-15 RX ORDER — DILTIAZEM HYDROCHLORIDE 180 MG/1
180 CAPSULE, EXTENDED RELEASE ORAL
Qty: 180 | Refills: 0 | Status: ACTIVE | COMMUNITY
Start: 2021-11-01

## 2022-11-15 RX ORDER — FLUCONAZOLE 150 MG/1
150 TABLET ORAL
Qty: 2 | Refills: 0 | Status: DISCONTINUED | COMMUNITY
Start: 2022-06-20

## 2022-11-15 RX ORDER — ROSUVASTATIN CALCIUM 10 MG/1
10 TABLET, FILM COATED ORAL
Qty: 90 | Refills: 0 | Status: ACTIVE | COMMUNITY
Start: 2022-06-24

## 2022-11-15 NOTE — REVIEW OF SYSTEMS
[Nasal Congestion] : nasal congestion [Postnasal Drip] : postnasal drip [Cough] : cough [SOB on Exertion] : sob on exertion [Seasonal Allergies] : seasonal allergies [GERD] : gerd [Depression] : depression [Anxiety] : anxiety [Thyroid Problem] : thyroid problem [Negative] : Neurologic

## 2022-11-15 NOTE — HISTORY OF PRESENT ILLNESS
[Current] : current [Never] : never [Excessive Daytime Sleepiness] : excessive daytime sleepiness [Snoring] : snoring [Unrefreshing Sleep] : unrefreshing sleep [Sleepy When Sedentary] : sleepy when sedentary [Morning Headaches] : morning headaches [Follow-Up - Routine Clinic] : a routine clinic follow-up of [Dyspnea on Exertion] : dyspnea on exertion [Non-Productive Cough] : non-productive cough [Currently Experiencing] : The patient is currently experiencing symptoms. [Long-Acting Beta Agonists] : long-acting beta agonists [Ipratropium] : ipratropium [Good Compliance] : good compliance with treatment [Good Tolerance] : good tolerance of treatment [Good Symptom Control] : good symptom control [On ___] : performed on [unfilled] [Patient] : the patient [Indication ___] : for an indication of [unfilled] [None] : no new symptoms reported [TextBox_4] : Pt is on Anoro for COPD as Bvespi caused palpitations.\par Pt is on Pepcid for GERD.\par Pt is s/p ablation for AF\par Smoking up to 2ppd, average of 1 ppd x 40 years and is down to <10 cigarettes.\par Pt c/o 2 month h/o SOBOE, cough, nasal congestion and was seen by PCP and was started on abx, steroids, nasal spray with some improvement.\par  [TextBox_11] : 2 [TextBox_13] : 40 [FreeTextEntry1] : \par \par  [Witnessed Apnea During Sleep] : no witnessed apnea during sleep [Witnessed Gasping During Sleep] : no witnessed gasping during sleep [FreeTextEntry9] : Chest CT [FreeTextEntry8] : emphysematous changes and a peripheral 4 mm calcified nodule

## 2022-11-15 NOTE — COUNSELING
[Cessation strategies including cessation program discussed] : Cessation strategies including cessation program discussed [Use of nicotine replacement therapies and other medications discussed] : Use of nicotine replacement therapies and other medications discussed [Encouraged to pick a quit date and identify support needed to quit] : Encouraged to pick a quit date and identify support needed to quit [Yes] : Willing to quit smoking [Potential consequences of obesity discussed] : Potential consequences of obesity discussed [Benefits of weight loss discussed] : Benefits of weight loss discussed [Encouraged to increase physical activity] : Encouraged to increase physical activity [ - Annual Lung Cancer Screening/Share Decision Making Discussion] : Annual Lung Cancer Screening/Share Decision Making Discussion. (I have advised this patient to have a Low Dose CT (LDCT) scan of the lungs and have discussed the following with the patient in a shared decision making discussion:   Benefits of Detection and Early Treatment: There is adequate evidence that annual screening for lung cancer with LDCT in a population of high-risk persons can prevent a substantial number of lung cancer–related deaths. The magnitude of benefit depends on the individual patient's risk for lung cancer, as those who are at highest risk are most likely to benefit. Screening cannot prevent most lung cancer–related deaths, and does not replace smoking cessation. Harms of Detection and Early Intervention and Treatment: The harms associated with LDCT screening include false-negative and false-positive results, incidental findings, over diagnosis, and radiation exposure. False-positive LDCT results occur in a substantial proportion of screened persons; 95% of all positive results do not lead to a diagnosis of cancer. In a high-quality screening program, further imaging can resolve most false-positive results; however, some patients may require invasive procedures. Radiation harms, including cancer resulting from cumulative exposure to radiation, vary depending on the age at the start of screening; the number of scans received; and the person's exposure to other sources of radiation, particularly other medical imaging.) [FreeTextEntry1] : 4

## 2022-11-15 NOTE — DISCUSSION/SUMMARY
[FreeTextEntry1] : \par #1. Change Bvespi to Anoro given palpitations on Bvespi for moderate COPD; PFTs near baseline\par #2. Diet and exercise for weight loss\par #3. Stressed smoking cessation\par #4. Nicotine replacement therapy with patches as needed\par #5. Referred patient to the [facility] smoking cessation program in the past\par #6. Offered CPAP titration study to treat moderate BENTON; otherwise could consider autoCPAP for therapy but pt does not want to proceed with therapy. The patient understands the risks of untreated BENTON including heart disease, strokes, hypertension, pulmonary hypertension, and motor vehicle accidents as well as the need for treatment and weight loss. \par #7. Cardiology f/u to optimize cardiac function\par #8. Flonase nasal spray for post nasal drip as needed \par #9. Chest CT for lung cancer screening given smoking hx. Risks and benefits regarding screening CT discussed including but not limited to the benefit of early lung cancer detection as well as other possible unknown pathology but also risk of discovering nodules or other findings which may be benign but will require periodic evaluation. Prior CT revealed emphysematous changes and a peripheral 4 mm calcified nodule. Will repeat annually\par #10. F/u in 1-2 months with PFTs and chest CT\par #11. Pt had both Covid vaccines and booster; rec flu vaccine\par #12. Reviewed risks of exposure and symptoms of Covid-19 virus, including how the virus is spread and precautions to avoid jimbo virus.\par \par The patient expressed understanding and agreement with the above recommendations/plan and accepts responsibility to be compliant with recommended testing, therapies, and f/u visits.\par All relevant questions and concerns were addressed.

## 2022-11-15 NOTE — PHYSICAL EXAM
[No Acute Distress] : no acute distress [Normal Appearance] : normal appearance [Supple] : supple [Normal Rate/Rhythm] : normal rate/rhythm [Normal S1, S2] : normal s1, s2 [No Murmurs] : no murmurs [No Resp Distress] : no resp distress [No Acc Muscle Use] : no acc muscle use [Normal Rhythm and Effort] : normal rhythm and effort [Clear to Auscultation Bilaterally] : clear to auscultation bilaterally [No Abnormalities] : no abnormalities [Benign] : benign [Not Tender] : not tender [Soft] : soft [No Clubbing] : no clubbing [No Edema] : no edema [No Focal Deficits] : no focal deficits [Oriented x3] : oriented x3

## 2022-11-15 NOTE — CONSULT LETTER
[Dear  ___] : Dear  [unfilled], [Consult Letter:] : I had the pleasure of evaluating your patient, [unfilled]. [Please see my note below.] : Please see my note below. [Consult Closing:] : Thank you very much for allowing me to participate in the care of this patient.  If you have any questions, please do not hesitate to contact me. [Sincerely,] : Sincerely, [DrDevan  ___] : Dr. SINGH [FreeTextEntry3] : Prabhakar Jane MD, FCCP, D. ABSM\par Pulmonary and Sleep Medicine\par [facility] at [place]\par

## 2022-11-18 ENCOUNTER — NON-APPOINTMENT (OUTPATIENT)
Age: 67
End: 2022-11-18

## 2022-11-18 VITALS — HEIGHT: 66 IN | BODY MASS INDEX: 29.09 KG/M2 | WEIGHT: 181 LBS

## 2022-11-18 DIAGNOSIS — F17.210 NICOTINE DEPENDENCE, CIGARETTES, UNCOMPLICATED: ICD-10-CM

## 2022-11-18 NOTE — HISTORY OF PRESENT ILLNESS
[Current] : Current [TextBox_13] : Referred by Dr. Prabhakar Jane.\par \par Ms. MESSINA is a 66 year old female with a history of CAD, COPD and emphysema. Reviewed and confirmed that the patient meets screening eligibility criteria:\par \par 66 years old \par \par Smoking Status: Current Smoker\par \par Number of pack(s) per day: 1\par Number of years smoked: 48\par Number of pack years smokin\par \par \par No symptoms of lung cancer, including new cough, change in cough, hemoptysis, and unintentional weight loss.\par \par No personal history of lung cancer. No lung cancer in a first degree relative. No history of occupational exposures.  [PacksperDay] : 1 [N_Years] : 48 [PacksperYear] : 48

## 2022-11-18 NOTE — PLAN
What Is The Reason For Today's Visit?: Full Body Skin Examination What Is The Reason For Today's Visit? (Being Monitored For X): the development of new lesions [FreeTextEntry1] : - Low Dose CT chest for lung cancer screening.\par \par

## 2022-12-02 ENCOUNTER — OUTPATIENT (OUTPATIENT)
Dept: OUTPATIENT SERVICES | Facility: HOSPITAL | Age: 67
LOS: 1 days | End: 2022-12-02

## 2022-12-02 ENCOUNTER — APPOINTMENT (OUTPATIENT)
Dept: CT IMAGING | Facility: CLINIC | Age: 67
End: 2022-12-02

## 2022-12-02 DIAGNOSIS — Z87.891 PERSONAL HISTORY OF NICOTINE DEPENDENCE: ICD-10-CM

## 2022-12-02 DIAGNOSIS — Z98.890 OTHER SPECIFIED POSTPROCEDURAL STATES: Chronic | ICD-10-CM

## 2022-12-02 PROCEDURE — 71271 CT THORAX LUNG CANCER SCR C-: CPT | Mod: 26

## 2023-01-27 ENCOUNTER — APPOINTMENT (OUTPATIENT)
Dept: PULMONOLOGY | Facility: CLINIC | Age: 68
End: 2023-01-27
Payer: MEDICARE

## 2023-01-27 VITALS — BODY MASS INDEX: 29.63 KG/M2 | HEIGHT: 65.5 IN | WEIGHT: 180 LBS

## 2023-01-27 VITALS
RESPIRATION RATE: 14 BRPM | DIASTOLIC BLOOD PRESSURE: 68 MMHG | HEART RATE: 78 BPM | OXYGEN SATURATION: 97 % | SYSTOLIC BLOOD PRESSURE: 128 MMHG

## 2023-01-27 PROCEDURE — 99214 OFFICE O/P EST MOD 30 MIN: CPT | Mod: 25

## 2023-01-27 PROCEDURE — 94010 BREATHING CAPACITY TEST: CPT

## 2023-01-27 PROCEDURE — 94727 GAS DIL/WSHOT DETER LNG VOL: CPT

## 2023-01-27 PROCEDURE — 99406 BEHAV CHNG SMOKING 3-10 MIN: CPT

## 2023-01-27 PROCEDURE — 85018 HEMOGLOBIN: CPT | Mod: QW

## 2023-01-27 PROCEDURE — 94729 DIFFUSING CAPACITY: CPT

## 2023-01-27 NOTE — HISTORY OF PRESENT ILLNESS
[Excessive Daytime Sleepiness] : excessive daytime sleepiness [Snoring] : snoring [Unrefreshing Sleep] : unrefreshing sleep [Sleepy When Sedentary] : sleepy when sedentary [Morning Headaches] : morning headaches [Follow-Up - Routine Clinic] : a routine clinic follow-up of [Dyspnea on Exertion] : dyspnea on exertion [Non-Productive Cough] : non-productive cough [Currently Experiencing] : The patient is currently experiencing symptoms. [Long-Acting Beta Agonists] : long-acting beta agonists [Ipratropium] : ipratropium [Good Compliance] : good compliance with treatment [Good Tolerance] : good tolerance of treatment [Good Symptom Control] : good symptom control [On ___] : performed on [unfilled] [Patient] : the patient [Indication ___] : for an indication of [unfilled] [None] : no new symptoms reported [TextBox_4] : Smoking up to 2ppd, average of 1 ppd x 40 years and is down to <10 cigarettes.\par Pt is on Anoro for COPD as Bvespi caused palpitations.\par Pt is on Pepcid for GERD.\par Pt is s/p ablation for AF\par Pt c/o 2 month h/o SOBOE, cough, nasal congestion and was seen by PCP and was started on abx, steroids, nasal spray with some improvement.\par  [FreeTextEntry1] : \par \par  [Witnessed Apnea During Sleep] : no witnessed apnea during sleep [Witnessed Gasping During Sleep] : no witnessed gasping during sleep [FreeTextEntry9] : Chest CT [FreeTextEntry8] : emphysematous changes and a peripheral 4 mm calcified nodule

## 2023-01-27 NOTE — CONSULT LETTER
[Dear  ___] : Dear  [unfilled], [Consult Letter:] : I had the pleasure of evaluating your patient, [unfilled]. [Please see my note below.] : Please see my note below. [Consult Closing:] : Thank you very much for allowing me to participate in the care of this patient.  If you have any questions, please do not hesitate to contact me. [Sincerely,] : Sincerely, [DrDevan  ___] : Dr. SINGH [FreeTextEntry3] : Prabhakar Jane MD, FCCP, D. ABSM\par Pulmonary and Sleep Medicine\par [facility] [place]\par

## 2023-01-27 NOTE — COUNSELING
[Cessation strategies including cessation program discussed] : Cessation strategies including cessation program discussed [Use of nicotine replacement therapies and other medications discussed] : Use of nicotine replacement therapies and other medications discussed [Encouraged to pick a quit date and identify support needed to quit] : Encouraged to pick a quit date and identify support needed to quit [Yes] : Willing to quit smoking [Potential consequences of obesity discussed] : Potential consequences of obesity discussed [Benefits of weight loss discussed] : Benefits of weight loss discussed [Encouraged to increase physical activity] : Encouraged to increase physical activity [FreeTextEntry1] : 4

## 2023-01-27 NOTE — PROCEDURE
[FreeTextEntry1] : Natan from 3/21/19 - moderate COPD\par PFTs 12/21/21 - Normal FVC and moderately reduced FEV1 with an obstructive pattern; lung volumes are normal with a moderately reduced diffusion capacity; overall, near baseline.\par PFTs 1/27/23 - Normal FVC and moderately reduced FEV1 with an obstructive pattern and normal lung volumes but severely reduced DLCO; worse than previous

## 2023-01-27 NOTE — DISCUSSION/SUMMARY
[FreeTextEntry1] : \par #1. Changed Bvespi to Anoro given palpitations on Bvespi for moderate COPD; PFTs were near baseline but given worsening, will change to Trelegy 100 and evaluate response; reviewed inhaler technique and stressed importance of rinsing mouth and throat after inhaler use \par #2. Diet and exercise for weight loss\par #3. Stressed smoking cessation\par #4. Nicotine replacement therapy with patches as needed\par #5. Referred patient to the [facility] smoking cessation program in the past\par #6. Offered CPAP titration study to treat moderate BENTON otherwise could consider autoCPAP for therapy but pt does not want to proceed with therapy. The patient understands the risks of untreated BENTON including heart disease, strokes, hypertension, pulmonary hypertension, and motor vehicle accidents as well as the need for treatment and weight loss. \par #7. Cardiology f/u to optimize cardiac function\par #8. Flonase nasal spray for post nasal drip as needed \par #9. Chest CT for lung cancer screening given smoking hx. Current CT is stable. Will repeat annually\par #10. F/u in 2 months with spirometry to assess response to Trelegy 100\par #11. Pt had both Covid vaccines and booster; rec flu vaccine\par #12. Reviewed risks of exposure and symptoms of Covid-19 virus, including how the virus is spread and precautions to avoid jimbo virus.\par \par The patient expressed understanding and agreement with the above recommendations/plan and accepts responsibility to be compliant with recommended testing, therapies, and f/u visits.\par All relevant questions and concerns were addressed.

## 2023-07-14 ENCOUNTER — APPOINTMENT (OUTPATIENT)
Dept: PULMONOLOGY | Facility: CLINIC | Age: 68
End: 2023-07-14
Payer: MEDICARE

## 2023-07-14 VITALS
OXYGEN SATURATION: 95 % | HEART RATE: 108 BPM | SYSTOLIC BLOOD PRESSURE: 122 MMHG | DIASTOLIC BLOOD PRESSURE: 72 MMHG | RESPIRATION RATE: 14 BRPM

## 2023-07-14 VITALS — WEIGHT: 173 LBS | BODY MASS INDEX: 27.15 KG/M2 | HEIGHT: 67 IN

## 2023-07-14 DIAGNOSIS — F17.200 NICOTINE DEPENDENCE, UNSPECIFIED, UNCOMPLICATED: ICD-10-CM

## 2023-07-14 PROCEDURE — 99406 BEHAV CHNG SMOKING 3-10 MIN: CPT

## 2023-07-14 PROCEDURE — 94010 BREATHING CAPACITY TEST: CPT

## 2023-07-14 PROCEDURE — 99214 OFFICE O/P EST MOD 30 MIN: CPT | Mod: 25

## 2023-07-14 RX ORDER — METHYLPREDNISOLONE 4 MG/1
4 TABLET ORAL
Qty: 1 | Refills: 0 | Status: ACTIVE | COMMUNITY
Start: 2023-07-14 | End: 1900-01-01

## 2023-07-14 NOTE — CONSULT LETTER
[Dear  ___] : Dear  [unfilled], [Consult Letter:] : I had the pleasure of evaluating your patient, [unfilled]. [Please see my note below.] : Please see my note below. [Consult Closing:] : Thank you very much for allowing me to participate in the care of this patient.  If you have any questions, please do not hesitate to contact me. [Sincerely,] : Sincerely, [DrDevan  ___] : Dr. SINGH [FreeTextEntry3] : Prabhakar Jane MD, FCCP, D. ABSM\par Pulmonary and Sleep Medicine\par [facility] at [place]\par

## 2023-07-14 NOTE — HISTORY OF PRESENT ILLNESS
[Excessive Daytime Sleepiness] : excessive daytime sleepiness [Snoring] : snoring [Unrefreshing Sleep] : unrefreshing sleep [Sleepy When Sedentary] : sleepy when sedentary [Morning Headaches] : morning headaches [Follow-Up - Routine Clinic] : a routine clinic follow-up of [Dyspnea on Exertion] : dyspnea on exertion [Non-Productive Cough] : non-productive cough [Currently Experiencing] : The patient is currently experiencing symptoms. [Long-Acting Beta Agonists] : long-acting beta agonists [Ipratropium] : ipratropium [Good Compliance] : good compliance with treatment [Good Tolerance] : good tolerance of treatment [Good Symptom Control] : good symptom control [On ___] : performed on [unfilled] [Patient] : the patient [Indication ___] : for an indication of [unfilled] [None] : no new symptoms reported [TextBox_4] : Smoking up to 2ppd, average of 1 ppd x 40 years and is down to <10 cigarettes.\par Pt is on Anoro for COPD as Bvespi caused palpitations.\par Pt is on Pepcid for GERD.\par Pt is s/p ablation for AF\par Pt c/o SOBOE, cough, nasal congestion and was seen by PCP and was started on abx, steroids, nasal spray with some improvement.\par  [FreeTextEntry1] : \par \par  [Witnessed Apnea During Sleep] : no witnessed apnea during sleep [Witnessed Gasping During Sleep] : no witnessed gasping during sleep [FreeTextEntry9] : Chest CT [FreeTextEntry8] : emphysematous changes and a peripheral 4 mm calcified nodule

## 2023-07-14 NOTE — DISCUSSION/SUMMARY
[FreeTextEntry1] : \par #1. Changed Bvespi to Anoro given palpitations on Bvespi for moderate COPD; PFTs were near baseline but given worsening, changed to Trelegy 100 but without spirometric or clinical improvement so will resume Anoro therapy given prior thrush likely from ICS in Trelegy; Medrol dose colten as needed\par #2. Diet and exercise for weight loss\par #3. Stressed smoking cessation\par #4. Nicotine replacement therapy with patches as needed\par #5. Referred patient to the [facility] smoking cessation program in the past\par #6. Offered CPAP titration study to treat moderate BENTON otherwise could consider autoCPAP for therapy but pt does not want to proceed with therapy. The patient understands the risks of untreated BENTON including heart disease, strokes, hypertension, pulmonary hypertension, and motor vehicle accidents as well as the need for treatment and weight loss. \par #7. Cardiology f/u to optimize cardiac function\par #8. Flonase nasal spray for post nasal drip as needed \par #9. Chest CT for lung cancer screening given smoking hx. Current CT is stable. Will repeat annually\par #10. F/u in 3 months \par #11. Pt had both Covid vaccines and booster; rec flu vaccine\par #12. Reviewed risks of exposure and symptoms of Covid-19 virus, including how the virus is spread and precautions to avoid jimbo virus.\par \par The patient expressed understanding and agreement with the above recommendations/plan and accepts responsibility to be compliant with recommended testing, therapies, and f/u visits.\par All relevant questions and concerns were addressed.

## 2023-08-10 ENCOUNTER — RX CHANGE (OUTPATIENT)
Age: 68
End: 2023-08-10

## 2023-08-10 RX ORDER — TIOTROPIUM BROMIDE INHALATION SPRAY 3.12 UG/1
2.5 SPRAY, METERED RESPIRATORY (INHALATION) DAILY
Qty: 3 | Refills: 3 | Status: DISCONTINUED | COMMUNITY
Start: 2023-08-09 | End: 2023-08-10

## 2023-10-09 ENCOUNTER — APPOINTMENT (OUTPATIENT)
Dept: PULMONOLOGY | Facility: CLINIC | Age: 68
End: 2023-10-09
Payer: MEDICARE

## 2023-10-09 VITALS — HEIGHT: 67 IN | WEIGHT: 181 LBS | BODY MASS INDEX: 28.41 KG/M2

## 2023-10-09 VITALS
SYSTOLIC BLOOD PRESSURE: 122 MMHG | OXYGEN SATURATION: 95 % | HEART RATE: 101 BPM | RESPIRATION RATE: 16 BRPM | DIASTOLIC BLOOD PRESSURE: 72 MMHG

## 2023-10-09 PROCEDURE — 99214 OFFICE O/P EST MOD 30 MIN: CPT

## 2024-01-09 ENCOUNTER — APPOINTMENT (OUTPATIENT)
Dept: PULMONOLOGY | Facility: CLINIC | Age: 69
End: 2024-01-09

## 2024-01-18 RX ORDER — FLUTICASONE FUROATE, UMECLIDINIUM BROMIDE AND VILANTEROL TRIFENATATE 100; 62.5; 25 UG/1; UG/1; UG/1
100-62.5-25 POWDER RESPIRATORY (INHALATION)
Qty: 3 | Refills: 2 | Status: ACTIVE | COMMUNITY
Start: 2023-01-27 | End: 1900-01-01

## 2024-02-20 ENCOUNTER — NON-APPOINTMENT (OUTPATIENT)
Age: 69
End: 2024-02-20

## 2024-02-21 ENCOUNTER — OFFICE (OUTPATIENT)
Dept: URBAN - METROPOLITAN AREA CLINIC 6 | Facility: CLINIC | Age: 69
Setting detail: OPHTHALMOLOGY
End: 2024-02-21
Payer: COMMERCIAL

## 2024-02-21 DIAGNOSIS — H16.223: ICD-10-CM

## 2024-02-21 DIAGNOSIS — Z96.1: ICD-10-CM

## 2024-02-21 DIAGNOSIS — H26.491: ICD-10-CM

## 2024-02-21 PROCEDURE — 99214 OFFICE O/P EST MOD 30 MIN: CPT | Performed by: OPHTHALMOLOGY

## 2024-02-21 ASSESSMENT — REFRACTION_CURRENTRX
OD_VPRISM_DIRECTION: SV
OS_SPHERE: +0.25
OS_OVR_VA: 20/
OS_CYLINDER: -0.75
OD_OVR_VA: 20/
OS_VPRISM_DIRECTION: SV
OS_OVR_VA: 20/
OD_OVR_VA: 20/
OS_AXIS: 106
OD_SPHERE: +0.75
OD_CYLINDER: -1.00
OD_AXIS: 69

## 2024-02-21 ASSESSMENT — REFRACTION_MANIFEST
OD_AXIS: 096
OS_AXIS: 097
OS_VA2: 20/20
OS_VA1: 20/20
OS_SPHERE: +1.00
OD_CYLINDER: -1.00
OD_ADD: +2.75
OD_VA1: 20/20
OD_VA2: 20/20
OS_CYLINDER: -0.75
OS_ADD: +2.75
OD_SPHERE: +0.50

## 2024-02-21 ASSESSMENT — DECREASING TEAR LAKE - SEVERITY SCORE
OS_DEC_TEARLAKE: 1+
OD_DEC_TEARLAKE: 1+

## 2024-02-21 ASSESSMENT — SPHEQUIV_DERIVED
OD_SPHEQUIV: 0.375
OS_SPHEQUIV: 0.625
OS_SPHEQUIV: 0.375
OD_SPHEQUIV: 0

## 2024-02-21 ASSESSMENT — REFRACTION_AUTOREFRACTION
OS_AXIS: 109
OD_AXIS: 093
OD_CYLINDER: -1.75
OS_SPHERE: +0.75
OD_SPHERE: +1.25
OS_CYLINDER: -0.75

## 2024-02-21 ASSESSMENT — CONFRONTATIONAL VISUAL FIELD TEST (CVF)
OS_FINDINGS: FULL
OD_FINDINGS: FULL

## 2024-03-05 ENCOUNTER — OFFICE (OUTPATIENT)
Dept: URBAN - METROPOLITAN AREA CLINIC 6 | Facility: CLINIC | Age: 69
Setting detail: OPHTHALMOLOGY
End: 2024-03-05
Payer: COMMERCIAL

## 2024-03-05 ENCOUNTER — RX ONLY (RX ONLY)
Age: 69
End: 2024-03-05

## 2024-03-05 DIAGNOSIS — H26.491: ICD-10-CM

## 2024-03-05 PROCEDURE — 66821 AFTER CATARACT LASER SURGERY: CPT | Mod: RT | Performed by: OPHTHALMOLOGY

## 2024-03-05 ASSESSMENT — REFRACTION_CURRENTRX
OD_AXIS: 69
OD_SPHERE: +0.75
OD_OVR_VA: 20/
OS_AXIS: 106
OD_VPRISM_DIRECTION: SV
OS_CYLINDER: -0.75
OD_CYLINDER: -1.00
OS_OVR_VA: 20/
OD_OVR_VA: 20/
OS_OVR_VA: 20/
OS_VPRISM_DIRECTION: SV
OS_SPHERE: +0.25

## 2024-03-05 ASSESSMENT — REFRACTION_MANIFEST
OD_ADD: +2.75
OS_CYLINDER: -0.75
OD_VA2: 20/20
OS_SPHERE: +1.00
OS_ADD: +2.75
OS_VA1: 20/20
OD_AXIS: 096
OS_AXIS: 097
OS_VA2: 20/20
OD_CYLINDER: -1.00
OD_SPHERE: +0.50
OD_VA1: 20/20

## 2024-03-05 ASSESSMENT — SPHEQUIV_DERIVED
OD_SPHEQUIV: 0
OS_SPHEQUIV: 0.625

## 2024-06-18 RX ORDER — FLUTICASONE PROPIONATE AND SALMETEROL 250; 50 UG/1; UG/1
250-50 POWDER RESPIRATORY (INHALATION)
Qty: 1 | Refills: 5 | Status: DISCONTINUED | COMMUNITY
Start: 2023-08-09 | End: 2024-06-18

## 2024-06-19 ENCOUNTER — APPOINTMENT (OUTPATIENT)
Dept: PULMONOLOGY | Facility: CLINIC | Age: 69
End: 2024-06-19
Payer: MEDICARE

## 2024-06-19 VITALS — OXYGEN SATURATION: 93 % | HEART RATE: 66 BPM

## 2024-06-19 VITALS
HEIGHT: 67 IN | SYSTOLIC BLOOD PRESSURE: 142 MMHG | RESPIRATION RATE: 16 BRPM | DIASTOLIC BLOOD PRESSURE: 72 MMHG | BODY MASS INDEX: 29.51 KG/M2 | WEIGHT: 188 LBS

## 2024-06-19 DIAGNOSIS — Z87.891 PERSONAL HISTORY OF NICOTINE DEPENDENCE: ICD-10-CM

## 2024-06-19 DIAGNOSIS — G47.33 OBSTRUCTIVE SLEEP APNEA (ADULT) (PEDIATRIC): ICD-10-CM

## 2024-06-19 DIAGNOSIS — R53.82 CHRONIC FATIGUE, UNSPECIFIED: ICD-10-CM

## 2024-06-19 DIAGNOSIS — E66.3 OVERWEIGHT: ICD-10-CM

## 2024-06-19 DIAGNOSIS — R06.02 SHORTNESS OF BREATH: ICD-10-CM

## 2024-06-19 DIAGNOSIS — J44.9 CHRONIC OBSTRUCTIVE PULMONARY DISEASE, UNSPECIFIED: ICD-10-CM

## 2024-06-19 DIAGNOSIS — E66.9 OBESITY, UNSPECIFIED: ICD-10-CM

## 2024-06-19 PROCEDURE — 99215 OFFICE O/P EST HI 40 MIN: CPT | Mod: 25

## 2024-06-19 PROCEDURE — 94010 BREATHING CAPACITY TEST: CPT

## 2024-06-19 PROCEDURE — 99406 BEHAV CHNG SMOKING 3-10 MIN: CPT

## 2024-06-19 RX ORDER — BUDESONIDE AND FORMOTEROL FUMARATE DIHYDRATE 80; 4.5 UG/1; UG/1
80-4.5 AEROSOL RESPIRATORY (INHALATION) TWICE DAILY
Qty: 3 | Refills: 0 | Status: DISCONTINUED | COMMUNITY
Start: 2024-06-04 | End: 2024-06-19

## 2024-06-19 RX ORDER — BUDESONIDE, GLYCOPYRROLATE, AND FORMOTEROL FUMARATE 160; 9; 4.8 UG/1; UG/1; UG/1
160-9-4.8 AEROSOL, METERED RESPIRATORY (INHALATION) TWICE DAILY
Qty: 1 | Refills: 5 | Status: ACTIVE | COMMUNITY
Start: 2024-06-19 | End: 1900-01-01

## 2024-06-19 RX ORDER — UMECLIDINIUM BROMIDE AND VILANTEROL TRIFENATATE 62.5; 25 UG/1; UG/1
62.5-25 POWDER RESPIRATORY (INHALATION)
Qty: 3 | Refills: 1 | Status: DISCONTINUED | COMMUNITY
Start: 2020-07-24 | End: 2024-06-19

## 2024-06-19 RX ORDER — BENZONATATE 100 MG/1
100 CAPSULE ORAL
Qty: 30 | Refills: 0 | Status: DISCONTINUED | COMMUNITY
Start: 2022-06-06 | End: 2024-06-19

## 2024-06-19 RX ORDER — FLECAINIDE ACETATE 50 MG/1
TABLET ORAL
Refills: 0 | Status: DISCONTINUED | COMMUNITY
End: 2024-06-19

## 2024-06-19 RX ORDER — UMECLIDINIUM 62.5 UG/1
62.5 AEROSOL, POWDER ORAL
Qty: 3 | Refills: 3 | Status: DISCONTINUED | COMMUNITY
End: 2024-06-19

## 2024-06-19 RX ORDER — AMOXICILLIN AND CLAVULANATE POTASSIUM 875; 125 MG/1; MG/1
875-125 TABLET, COATED ORAL
Qty: 20 | Refills: 0 | Status: DISCONTINUED | COMMUNITY
Start: 2022-06-06 | End: 2024-06-19

## 2024-06-19 RX ORDER — FAMOTIDINE 40 MG/1
40 TABLET, FILM COATED ORAL
Qty: 90 | Refills: 1 | Status: DISCONTINUED | COMMUNITY
Start: 2020-03-12 | End: 2024-06-19

## 2024-06-19 NOTE — RESULTS/DATA
[TextEntry] : Chest CT imaging as above.   PSG from 12/10/19 revealed moderate BENTON with an AHI of 23.2.

## 2024-06-19 NOTE — COUNSELING
[Cessation strategies including cessation program discussed] : Cessation strategies including cessation program discussed [Use of nicotine replacement therapies and other medications discussed] : Use of nicotine replacement therapies and other medications discussed [Encouraged to pick a quit date and identify support needed to quit] : Encouraged to pick a quit date and identify support needed to quit [Yes] : Willing to quit smoking [Potential consequences of obesity discussed] : Potential consequences of obesity discussed [Benefits of weight loss discussed] : Benefits of weight loss discussed [Encouraged to increase physical activity] : Encouraged to increase physical activity [FreeTextEntry1] : 3

## 2024-06-19 NOTE — PROCEDURE
[FreeTextEntry1] : Natan from 3/21/19 - moderate COPD PFTs 12/21/21 - Normal FVC and moderately reduced FEV1 with obstruction; lung volumes are normal with a moderately reduced DLCO; overall, near baseline. PFTs 1/27/23 - Normal FVC and moderately reduced FEV1 with an obstructive pattern and normal lung volumes but severely reduced DLCO; worse than previous. Natan 6/19/24 - Normal FVC and severely reduced FEV1 with obstruction. Worse off inhaler therapy.

## 2024-06-19 NOTE — CONSULT LETTER
[Dear  ___] : Dear  [unfilled], [Consult Letter:] : I had the pleasure of evaluating your patient, [unfilled]. [Please see my note below.] : Please see my note below. [Consult Closing:] : Thank you very much for allowing me to participate in the care of this patient.  If you have any questions, please do not hesitate to contact me. [Sincerely,] : Sincerely, [DrDevan  ___] : Dr. SINGH [FreeTextEntry3] : Prabhakar Jane MD, FCCP, D. ABSM\par  Pulmonary and Sleep Medicine\par  [facility] at [place]\par

## 2024-06-19 NOTE — END OF VISIT
[Time Spent: ___ minutes] : I have spent [unfilled] minutes of time on the encounter. [TextEntry] : Discussed with pt at length regarding smoking hx, BENTON, weight issues, soboe, copd, abnormal CT, nodule, lung cancer screening; reviewed prior w/u with pt as above.

## 2024-06-19 NOTE — HISTORY OF PRESENT ILLNESS
[Excessive Daytime Sleepiness] : excessive daytime sleepiness [Snoring] : snoring [Unrefreshing Sleep] : unrefreshing sleep [Sleepy When Sedentary] : sleepy when sedentary [Morning Headaches] : morning headaches [Follow-Up - Routine Clinic] : a routine clinic follow-up of [Dyspnea on Exertion] : dyspnea on exertion [Non-Productive Cough] : non-productive cough [Currently Experiencing] : The patient is currently experiencing symptoms. [Long-Acting Beta Agonists] : long-acting beta agonists [Ipratropium] : ipratropium [Good Compliance] : good compliance with treatment [Good Tolerance] : good tolerance of treatment [Good Symptom Control] : good symptom control [On ___] : performed on [unfilled] [Patient] : the patient [Indication ___] : for an indication of [unfilled] [None] : no new symptoms reported [TextBox_4] : Smoking up to 2ppd, average of 1 ppd x 40 years and was down to <10 cigarettes and now quit smoking in 9/2023 and reports decreased cough and improved SOBOE Pt is on Pepcid for GERD. Pt is s/p ablation for AF Pt c/o SOBOE, cough, nasal congestion and was seen by PCP and was started on abx, steroids, nasal spray with some improvement. She was sleeping better since she quit smoking but now is smoking occasionally again. Pt was on Anoro for COPD as Bvespi caused palpitations but then changed to Trelegy 100 or Breztri though now is not on therapy due to expense. She was started on Breyna (Symbicort) but reports no relief. [FreeTextEntry1] : \par  \par   [Witnessed Apnea During Sleep] : no witnessed apnea during sleep [Witnessed Gasping During Sleep] : no witnessed gasping during sleep [FreeTextEntry9] : Chest CT [FreeTextEntry8] : emphysematous changes and a peripheral 4 mm calcified nodule [TextEntry] : Chest CT from 1/30/20 revealed stable changes including calcified granuloma in NEFTALI. Chest CT from 10/28/21 revealed stable changes. Chest CT from 12/2/22 revealed stable nodule. Chest CT from 2/20/24 revealed multiple nodules measuring up to 6 mm in size but was not c/w prior as was done in a different facility.

## 2024-06-19 NOTE — DISCUSSION/SUMMARY
[FreeTextEntry1] : #1. Changed Bvespi to Anoro given palpitations on Bvespi for moderate COPD; PFTs were near baseline but given worsening, changed to Trelegy 100 or Brestri though did not have spirometry improvement but with clinical improvement. She stopped using due to expense so gave pt samples of Trelegy 100 and Breztri while pt enquires about inhaler therapy coverage from her insurance company. #2. Diet and exercise for weight loss. #3. Stressed smoking cessation. #4. Nicotine replacement therapy with patches as needed. Quit smoking 9/2023 though now smoking intermittently again. Stressed smoking cessation. #5. Pt reports she is breathing and sleeping better since she quit smoking. #6. Offered CPAP titration study to treat moderate BENTON otherwise could consider autoCPAP for therapy but pt does not want to proceed with therapy. The patient understands the risks of untreated BENTON including heart disease, strokes, hypertension, pulmonary hypertension, and motor vehicle accidents as well as the need for treatment and weight loss. She feels she is sleeping better since she quit smoking. #7. Cardiology f/u to optimize cardiac function. #8. Flonase nasal spray for postnasal drip as needed. #9. Chest CT for lung cancer screening given smoking hx. Current CT with small nodules but was not c/w previous studies so pt will have current CT from [facility] c/w priors from [facility]. If nodules are stable, continue annual CT with next in 2-3/2025. #10. F/u in 6 weeks with PFTs on inhaler therapy and with chest CT comparison. #11. Pt had both Covid vaccines and booster; rec flu vaccine.  The patient expressed understanding and agreement with the above recommendations/plan and accepts responsibility to be compliant with recommended testing, therapies, and f/u visits. All relevant questions and concerns were addressed.

## 2024-06-24 ENCOUNTER — APPOINTMENT (OUTPATIENT)
Dept: PULMONOLOGY | Facility: CLINIC | Age: 69
End: 2024-06-24

## 2024-10-04 ENCOUNTER — APPOINTMENT (OUTPATIENT)
Dept: PULMONOLOGY | Facility: CLINIC | Age: 69
End: 2024-10-04
Payer: MEDICARE

## 2024-10-04 VITALS
HEART RATE: 70 BPM | HEIGHT: 65 IN | BODY MASS INDEX: 30.32 KG/M2 | DIASTOLIC BLOOD PRESSURE: 66 MMHG | OXYGEN SATURATION: 94 % | RESPIRATION RATE: 16 BRPM | WEIGHT: 182 LBS | SYSTOLIC BLOOD PRESSURE: 128 MMHG

## 2024-10-04 VITALS — BODY MASS INDEX: 30.32 KG/M2 | HEIGHT: 65 IN | WEIGHT: 182 LBS

## 2024-10-04 DIAGNOSIS — E66.9 OBESITY, UNSPECIFIED: ICD-10-CM

## 2024-10-04 DIAGNOSIS — R06.02 SHORTNESS OF BREATH: ICD-10-CM

## 2024-10-04 DIAGNOSIS — Z87.891 PERSONAL HISTORY OF NICOTINE DEPENDENCE: ICD-10-CM

## 2024-10-04 DIAGNOSIS — F17.200 NICOTINE DEPENDENCE, UNSPECIFIED, UNCOMPLICATED: ICD-10-CM

## 2024-10-04 DIAGNOSIS — J44.9 CHRONIC OBSTRUCTIVE PULMONARY DISEASE, UNSPECIFIED: ICD-10-CM

## 2024-10-04 DIAGNOSIS — G47.33 OBSTRUCTIVE SLEEP APNEA (ADULT) (PEDIATRIC): ICD-10-CM

## 2024-10-04 DIAGNOSIS — R53.82 CHRONIC FATIGUE, UNSPECIFIED: ICD-10-CM

## 2024-10-04 PROCEDURE — 99406 BEHAV CHNG SMOKING 3-10 MIN: CPT

## 2024-10-04 PROCEDURE — 94727 GAS DIL/WSHOT DETER LNG VOL: CPT

## 2024-10-04 PROCEDURE — 94010 BREATHING CAPACITY TEST: CPT

## 2024-10-04 PROCEDURE — 85018 HEMOGLOBIN: CPT | Mod: QW

## 2024-10-04 PROCEDURE — 94729 DIFFUSING CAPACITY: CPT

## 2024-10-04 PROCEDURE — 99214 OFFICE O/P EST MOD 30 MIN: CPT | Mod: 25

## 2024-10-04 NOTE — PROCEDURE
[FreeTextEntry1] : Natan from 3/21/19 - moderate COPD PFTs 12/21/21 - Normal FVC and moderately reduced FEV1 with obstruction; lung volumes are normal with a moderately reduced DLCO; overall, near baseline. PFTs 1/27/23 - Normal FVC and moderately reduced FEV1 with an obstructive pattern and normal lung volumes but severely reduced DLCO; worse than previous. Natan 6/19/24 - Normal FVC and severely reduced FEV1 with obstruction. Worse off inhaler therapy. PFTs 10/4/24 - Normal FVC and moderately reduced FEV1 with obstruction; improved from prior but intermittently using Trelegy 100. Lung volumes were normal with moderately reduced DLCO.

## 2024-10-04 NOTE — END OF VISIT
[Time Spent: ___ minutes] : I have spent [unfilled] minutes of time on the encounter which excludes teaching and separately reported services. [TextEntry] : Discussed with pt at length regarding smoking hx, BENTON, weight issues, soboe, copd, abnormal CT, nodule, lung cancer screening; reviewed prior w/u with pt as above.

## 2024-10-04 NOTE — DISCUSSION/SUMMARY
[FreeTextEntry1] : #1. Changed Bvespi to Anoro given palpitations on Bvespi for moderate COPD; PFTs were near baseline but given worsening, changed to Trelegy 100 or Brestri though did not have spirometry improvement but with clinical improvement. She stopped using due to expense so gave pt samples of Trelegy 100 and Breztri while pt enquires about inhaler therapy coverage from her insurance company. She does feel better with improved linwood on Trelegy 100. #2. Diet and exercise for weight loss. #3. Stressed smoking cessation. #4. Nicotine replacement therapy with patches as needed. Quit smoking 9/2023 though now smoking intermittently again. Stressed smoking cessation. #5. Pt reports she is breathing and sleeping better since she quit smoking though now smoking again. #6. Offered CPAP titration study to treat moderate BENTON otherwise could consider autoCPAP for therapy but pt does not want to proceed with therapy. The patient understands the risks of untreated BENTON including heart disease, strokes, hypertension, pulmonary hypertension, and motor vehicle accidents as well as the need for treatment and weight loss. She feels she is sleeping better since she quit smoking. #7. Cardiology f/u to optimize cardiac function. #8. Flonase nasal spray for postnasal drip as needed. #9. Chest CT for lung cancer screening given smoking hx. Current CT with small nodules but was not c/w previous studies so pt will have current CT from [facility] c/w priors from [facility] though this was not done. If nodules are stable, continue annual CT with next in 2-3/2025. #10. F/u in 3-4 months with linwood once she is using Trelegy more consistently. Eventual CT in 4-5 months. #11. Pt had both Covid vaccines and booster; rec flu vaccine.  The patient expressed understanding and agreement with the above recommendations/plan and accepts responsibility to be compliant with recommended testing, therapies, and f/u visits. All relevant questions and concerns were addressed.

## 2024-10-04 NOTE — CONSULT LETTER
[Dear  ___] : Dear  [unfilled], [Consult Letter:] : I had the pleasure of evaluating your patient, [unfilled]. [Please see my note below.] : Please see my note below. [Consult Closing:] : Thank you very much for allowing me to participate in the care of this patient.  If you have any questions, please do not hesitate to contact me. [Sincerely,] : Sincerely, [DrDevan  ___] : Dr. SINGH [FreeTextEntry3] : Prabhakar Jane MD, FCCP, D. ABSM\par  Pulmonary and Sleep Medicine\par  [facility] at [place]\par